# Patient Record
Sex: FEMALE | Race: WHITE
[De-identification: names, ages, dates, MRNs, and addresses within clinical notes are randomized per-mention and may not be internally consistent; named-entity substitution may affect disease eponyms.]

---

## 2019-12-12 ENCOUNTER — HOSPITAL ENCOUNTER (OUTPATIENT)
Dept: HOSPITAL 47 - PNWHC3 | Age: 44
Discharge: HOME | End: 2019-12-12
Attending: ANESTHESIOLOGY
Payer: COMMERCIAL

## 2019-12-12 DIAGNOSIS — M47.812: ICD-10-CM

## 2019-12-12 DIAGNOSIS — Z51.81: ICD-10-CM

## 2019-12-12 DIAGNOSIS — Z79.1: ICD-10-CM

## 2019-12-12 DIAGNOSIS — Z79.891: ICD-10-CM

## 2019-12-12 DIAGNOSIS — M50.20: ICD-10-CM

## 2019-12-12 DIAGNOSIS — Z79.890: ICD-10-CM

## 2019-12-12 DIAGNOSIS — M54.16: ICD-10-CM

## 2019-12-12 DIAGNOSIS — M79.18: ICD-10-CM

## 2019-12-12 DIAGNOSIS — Z79.899: ICD-10-CM

## 2019-12-12 DIAGNOSIS — G89.29: Primary | ICD-10-CM

## 2019-12-12 DIAGNOSIS — F17.200: ICD-10-CM

## 2019-12-12 PROCEDURE — 80307 DRUG TEST PRSMV CHEM ANLYZR: CPT

## 2019-12-12 PROCEDURE — 99211 OFF/OP EST MAY X REQ PHY/QHP: CPT

## 2019-12-12 NOTE — P.PAINPG
Subjective


Progress Note Date: 12/12/19





This is a follow-up visit for this 44 years old female with a chronic history of

severe neck pain, she has been managed with a combination of medications and 

interventional pain procedures.  She most recently underwent trigger point 

injections in her neck and low back on 10/23/2019, with good benefit.  She has 

been diagnosed with cervical spondylosis and cervical herniated disc disease, 

Status post  posterior cervical decompression, , cervical myofascial pain 

syndrome. she is currently on Norco  7.5/325 every 8 hours , Neurontin 400 mg 3 

times a day,meloxicam and Fioricet. she denies any side effect of the 

medication.  She was also obtaining Robaxin from our clinic, however her primary

care physician switched her over to tizanidine.  she denies any excessive 

drowsiness or sleepiness, she denies any suicidal ideation, patient wishes to 

decrease her pain medication, she continued to have some numbness in her right 

hand. pain in neck is rated as 5/10.  Today, she is also complaining of right 

low back pain radiating to right buttocks and lateral aspect of right thigh as 

well as anterior calf.  She rates this pain as 10/10.  She states that this pain

has been present on and off for approximately 18 years, usually subsides within 

1-2 days spontaneously.  However, this episode has been present since the first 

week of November and does not seem to be improving.  She was evaluated by her 

primary care physician who provided an intramuscular steroid injection, with 

modest benefit.  She has also been to a chiropractor with some benefit.  She 

denies numbness and tingling in lower extremities, she does note subjective 

weakness.she has not had any imaging of her lumbar spine.


Review of systems is negative for chest pain, shortness of breath, changes in 

vision, changes in hearing, new onset weakness, abdominal pain, diarrhea, 

extreme fatigue, malaise, fever, skin changes, homicidal or suicidal ideation, 

or bowel or bladder incontinence.





Objective


Physical exam:


Vitals: Reviewed in EMR


GENERAL: Well appearing, in no acute distress


PSYCH: Mood and affect is appropriate. Awake, alert, and oriented


SKIN: Skin color, texture, turgor normal, no rashes or lesions


HEENT: Normocephalic, atraumatic. EOM intact


CV: No pedal edema


RESP: Respirations are unlabored, no audible wheezing


GI: Abdomen non-distended


MUSCULOSKELETAL: Bilateral upper and lower extremity strength is normal and 

symmetric. No atrophy or tone abnormalities are noted.


Neck:tenderness to palpation over the cervical paraspinous muscles. Spurling 

negative, Sin's sign negative. palpable trigger points in cervical 

paraspinals, trapezius and rhomboids bilaterally.


Lumbar spine: Straight leg raising in the sitting position is positive on the 

right side for radicular pain. tenderness to palpation over the lumbar spine and

paraspinous muscles bilaterally, with palpable trigger points.


Buttocks: No pain to palpation over the PSIS, sacroiliac joint maneuvers are 

negative for pain.


Extremities: Peripheral joint ROM is full and pain free without obvious instab

ility or laxity in all four extremities. No edema or skin discolorations noted.


Gait: Gait is normal


NEUR: Bilateral upper and lower extremity coordination and muscle stretch


reflexes are physiologic and symmetric.  Negative clonus bilaterally. No loss of

sensation is noted.





 





Assessment and Plan


Plan: 


Assessment and plan=


   chronic neck pain secondary to cervical herniated disc disease  , cervical 

spondylosis, myofascial pain syndrome .


   Myofascial pain syndrome cervical trapezius suprascapular and right lumbar 

region


   lumbar radicular pain


   chronic and current  use of high-risk medication (opioids)


   Patient denies any side effects of the current pain medication  and the 

current treatment/medication helping the  patient to do activity of daily living

,


   she is interested in weaning narcotics.


   MAPS Reviewed and it was appropriate .


   Medication managements= patient will be given prescription refills for Norco 

5/325 every 8 hours dispense 90 with 1 refill this was reduced from Norco 7.5 

every 8 hours. we will plan on continuing to reduce her opioid dosage over time.

she is amenable to this plan. Prescriptions also provided Neurontin 400 mg 3 

times a day dispense 90 with 1 refill , meloxicam and Fioricet.  Patient no 

longer taking Robaxin as her primarycare physician is prescribing her tizanidine


   investigations: We will obtain a lumbar MRI prior to proceeding with lumbar 

procedures.  Prescription for 1 tablet of Xanax 0.5 mg was provided, as the 

patient is claustrophobic.  We will also obtain a urine drug screen today.


   Interventions= to be determined following lumbar MRI. patient could also 

benefit from trigger point injection right and left cervical and trapezius and 

suprascapular area and the right lumbar paravertebral muscles, we will plan on 

doing this with the next procedure.  


  I spent 3 minutes counseling her on the importance of smoking cessation as it 

relates to chronic pain and overall health 


    follow-up: After MRI





PQRS Measure Charge Sheet


Measure #130: Documentation of Current Meds in Medical Chart: Patient's 

medications documented in chart


Measure #226: Tobacco Use: Screen & Cessation Intervention: Pt screened for 

tobacco use AND intervention given


Measure #111: Pneumonia Vaccination: Pneumococcal vaccine NOT administered or 

previously given


Measure #47: Advance Care Plan: Advance care planning discussed & documented, pt

chose/unable to give


Measure #412: Opioid Treatment Agreement: Documented signed opioid trtmnt 

agreemnt min once during opioid trtmnt


Measure #408: Opioid Therapy Follow-up Evaluation: Patient had f/u eval minimum 

every 3 months during opioid therapy


Measure #317: Preventitive Care & Scrn High Bld Press & F/U: Normal blood 

pressure, f/u not required


Measure #128: Body Mass Index (BMI) Screening & Follow-up: BMI documented ABOVE 

normal parameters - f/u documented


Measure #131: Pain Assessment & Follow-up: Pain positive & plan documented, 

Follow-up scheduled


Measure #431: Unhealthy Alcohol Use Preventative Care & Scrn: Patient not 

identified as an unhealthy alcohol user





Objective





- Vital Signs


Vital signs: 


                                 Intake & Output











 12/11/19 12/12/19 12/12/19





 18:59 06:59 18:59


 


Weight 97.522 kg  














PQRS Measure Charge Sheet


PQRS Narrative: 


                                        





Smoking Status                   Current every day smoker


Narcotic Agreement Date Signed   03/08/18


Hx Alcohol Use (MH)              No








Home Medications: 


Ambulatory Orders





Thyroid,Pork [[place] Thyroid] 180 mg PO DAILY 06/11/14 


Citalopram Hydrobromide [CeleXA] 20 mg PO DAILY 05/02/17 


Rosuvastatin Calcium [Crestor] 10 mg PO DAILY 03/06/19 


Gabapentin [Neurontin] 400 mg PO TID #90 cap 08/22/19 


HYDROcodone/APAP 7.5-325MG [Norco 7.5-325] 1 tab PO TID 08/22/19 


Multivitamins, Thera [Multivitamin (formulary)] 1 tab PO DAILY 08/22/19 


Butalbit/Acetamin/Caff/Codeine [Butal/APAP/Caff/Cod -07-30MG] 1 tab PO Q6H

PRN 10/11/19 


Meloxicam [Mobic] 7.5 mg PO DAILY 10/11/19 


Methocarbamol [Robaxin] 500 mg PO TID PRN 10/11/19 











Controlled Substance Measures





- Controlled Substance Measures


Is patient prescribed a controlled substance at discharge?: Yes


When asked, does pt state using other controlled substances?: No


If prescribed controlled substance>3 days was MAPS reviewed?: Yes


If Rx opioid, was Start Talking consent form obtained?: Yes


If opioid is for acute pain is fill amount 7 days or less?: No


Was information provided regarding opioid addiction?: Yes

## 2019-12-24 ENCOUNTER — HOSPITAL ENCOUNTER (OUTPATIENT)
Dept: HOSPITAL 47 - RADMRIMAIN | Age: 44
Discharge: HOME | End: 2019-12-24
Attending: ANESTHESIOLOGY
Payer: COMMERCIAL

## 2019-12-24 DIAGNOSIS — M47.26: Primary | ICD-10-CM

## 2019-12-24 PROCEDURE — 72148 MRI LUMBAR SPINE W/O DYE: CPT

## 2019-12-24 NOTE — MR
EXAMINATION TYPE: MR lumbar spine wo con

 

DATE OF EXAM: 12/24/2019

 

COMPARISON: NONE

 

HISTORY: Low back pain, rt leg pain with numbness into foot

 

TECHNIQUE: Multiplanar, multisequence imaging of the lumbar spine is performed without IV contrast.

 

FINDINGS: Sagittal images of the lumbar spine show vertebral body heights and alignment to appear sat
isfactory. There is disc desiccation L3-L4 through the L5-S1 levels.  There is mild disc space narrow
ing with annular tear posteriorly L5-S1 level. The conus medullaris is normal in position and signal 
ending at L1 level.  The bone marrow signal intensity is within normal limits.

 

Axial images show the T12-L1, L1-L2, and L2-L3 levels all to appear within normal limits.

 

Axial images at the L3-L4 level show mild broad disc bulge minimally effacing the anterior thecal sac
, bilateral neural foramina remain patent.

 

Axial images at L4-L5 levels mild to Moderate facet degenerative changes bilaterally with mild broad 
the anterior thecal sac, bilateral neural foramina remain patent.

 

Axial images at L5-S1 level show mild facet degenerative changes bilaterally.  There is central disc 
protrusion but the spinal canal is preserved, bilateral neural foramina are patent. 

 

No suspicious incidental retroperitoneal finding.

 

IMPRESSION: Mild multilevel degenerative changes lower lumbar spine, no significant focal disc hernia
tion seen to account for patient's right-sided radiculopathy type symptoms.

## 2020-01-07 ENCOUNTER — HOSPITAL ENCOUNTER (OUTPATIENT)
Dept: HOSPITAL 47 - PNWHC3 | Age: 45
Discharge: HOME | End: 2020-01-07
Attending: ANESTHESIOLOGY
Payer: COMMERCIAL

## 2020-01-07 VITALS — DIASTOLIC BLOOD PRESSURE: 77 MMHG | HEART RATE: 83 BPM | SYSTOLIC BLOOD PRESSURE: 111 MMHG | RESPIRATION RATE: 18 BRPM

## 2020-01-07 DIAGNOSIS — M50.30: ICD-10-CM

## 2020-01-07 DIAGNOSIS — M46.92: ICD-10-CM

## 2020-01-07 DIAGNOSIS — F17.200: ICD-10-CM

## 2020-01-07 DIAGNOSIS — Z79.1: ICD-10-CM

## 2020-01-07 DIAGNOSIS — R51: ICD-10-CM

## 2020-01-07 DIAGNOSIS — Z79.890: ICD-10-CM

## 2020-01-07 DIAGNOSIS — M46.96: ICD-10-CM

## 2020-01-07 DIAGNOSIS — M47.816: ICD-10-CM

## 2020-01-07 DIAGNOSIS — G89.29: Primary | ICD-10-CM

## 2020-01-07 DIAGNOSIS — M79.18: ICD-10-CM

## 2020-01-07 DIAGNOSIS — M51.36: ICD-10-CM

## 2020-01-07 DIAGNOSIS — Z79.899: ICD-10-CM

## 2020-01-07 DIAGNOSIS — Z79.891: ICD-10-CM

## 2020-01-07 DIAGNOSIS — M47.812: ICD-10-CM

## 2020-01-07 PROCEDURE — 99211 OFF/OP EST MAY X REQ PHY/QHP: CPT

## 2020-01-07 NOTE — P.PAINPG
Subjective


Progress Note Date: 01/07/20





This is a follow-up visit for this 44 years old female with a chronic history of

severe neck pain, she has been managed with a combination of medications and 

interventional pain procedures.  She most recently underwent trigger point 

injections in her neck and low back on 10/23/2019, with good benefit.  She has 

been diagnosed with cervical spondylosis and cervical herniated disc disease, 

Status post  posterior cervical decompression, , cervical myofascial pain 

syndrome. she is currently on Norco  7.5/325 every 8 hours , Neurontin 400 mg 3 

times a day,meloxicam and Fioricet. she denies any side effect of the 

medication.  She was also obtaining Robaxin from our clinic, however her primary

care physician switched her over to tizanidine.  she denies any excessive 

drowsiness or sleepiness, she denies any suicidal ideation, patient wishes to 

decrease her pain medication, she continued to have some numbness in her right 

hand. pain in neck is rated as 5/10.  Today, she is also complaining of right 

low back pain radiating to right buttocks and lateral aspect of right thigh as 

well as anterior calf.  She rates this pain as 10/10.  She states that this pain

has been present on and off for approximately 18 years, usually subsides within 

1-2 days spontaneously.  However, this episode has been present since the first 

week of November and does not seem to be improving.  She was evaluated by her 

primary care physician who provided an intramuscular steroid injection, with 

modest benefit.  She has also been to a chiropractor with some benefit.  She 

denies numbness and tingling in lower extremities, she does note subjective 

weakness.she had MRI of the lumbar spine done recently and she is here today to 

discuss the results of the MRI


Review of systems is negative for chest pain, shortness of breath, changes in 

vision, changes in hearing, new onset weakness, abdominal pain, diarrhea, 

extreme fatigue, malaise, fever, skin changes, homicidal or suicidal ideation, 

or bowel or bladder incontinence.





Objective





- Vital Signs


Vital signs: 


                                   Vital Signs











Temp      


 


Pulse  83   01/07/20 13:48


 


Resp  18   01/07/20 13:48


 


BP  111/77   01/07/20 13:48


 


Pulse Ox  96   01/07/20 13:48














- Exam


    


   Physical Examinations  :


    -Constitutiona       : Cooperative , not in acute distress .


    -HEENT                :  nech :  supple ,  no Lymphadenopathy  , normal  

thyroid  size .


                               :   eyes  :  no ptosis , no icterus,  no 

photophobia .                                                                   

                                                                                

                                                                                

                                                                                

                                                             


    - neurologic         :   Cranial nerve II   to  XII  intact ,  no   focal 

neurological deffecit  .


    -psychatric          : alert ,  oriented  X 3  ,   appropriate affect   , 

intact judgment  and insight  .  


    -Lymphatic          :    no Lymphadenopathy .


   - musculoskeltal   :     


                                 Cervical Spine 


                                         motor  stregnth in the deltoid and 

biceps,   normal   right side    ,  normal  Left side


                                         motor  stregnth biceps and the wrist 

extensors   normal right side ,normal left side .


                                         motor stregnth in the triceps muscle . 

normal  Right side , normal  Left side  


                                         deep tendon reflexes  normal   at the 

biceps ,   normal  at Brachioradialis , normal  at triceps.


                                         cervical facet loading test: Positive 

Bilaterally


                                         Multiple trigger point in the cervical 

paravertebral muscles.


                                   Lumber spine


                                         moter stegnth lower extremities ,thigh 

and legs  5/5 Right side ,  5/5  Left side 


                                         deep tendon reflexes :   normal  Knee 

Jerk    , normal   ankle Jerk  


                                         lumber facet Loading Test =positive 

Right , positive Left 


                                         Range of motion of the lumbar spine  

Flexion  30 degrees,   extension   10 degrees


                                         strait leg raising test = positive at  

   degree   


                                          Fabere test= positive Right ,    and  

positive  LT .


MRI of the lumbar spine done at the Hospital= multilevel lumbar compression test

disease and multilevel lumbar spondylosis with lumbar facet arthropathy


       





Assessment and Plan


Plan: 


Assessment and plan=


   chronic low back pain secondary to lumbar degenerative disc disease  , lumbar

spondylosis with lumbar facet arthropathy .


   Neck pain and headache secondary to cervical degenerative disc disease and 

cervical spondylosis, myofascial pain syndrome and cervical area


   chronic and current  use of high-risk medication (opioids)


   Patient denies any side effects of the current pain medication  and the 

current treatment/medication helping the  patient to do activity of daily living

,


   Diagnoses, prognosis, treatment options, including but not limited to 

physical therapy, medication management, interventional therapies, and surgery, 

were discussed with the patient


   All the questions answered


   The narcotic consent was signed and patient agreed and understood the side 

effects and complications of opioid treatment.


   Patient signed the narcotic agreement, and  was orally counseled, not to 

overuse, not to abuse, not to Divert , not tp sell pain medication, and to take 

it as prescribed only,


   Patient was counseled not to drive or operate heavy equipment while using 

narcotic medication, and advised not to use alcohol or any Illicit  drugs while 

using the narcotis.


   understanding that lack of compliance with any of the above instructions, 

will likely to cause discharge from, the pain service, not to renew his narcotic

prescriptions


   MAPS Reviwed and it was apropriate .


   Medication managements= patient has prescription refill for her medication on

last visit, but she reported that her frequency of the headache increases 

significantly


   She is currently not benefiting from Fioricet and she used to have a better 

result with the Fiorinal, prescription for Fiorinal one tablet by mouth every 6 

hours when necessary dispensed 


   15 tablet and one refill, and patient could benefit from lumbar epidural 

steroid injection at L4 5 levels (right paramedian approach )


               , 


Time with Patient: Less than 30





PQRS Measure Charge Sheet


Measure #130: Documentation of Current Meds in Medical Chart: Patient's 

medications documented in chart


Measure #226: Tobacco Use: Screen & Cessation Intervention: Pt screened for 

tobacco use AND intervention given


Measure #111: Pneumonia Vaccination: Pneumococcal vaccine NOT administered or 

previously given


Measure #47: Advance Care Plan: Advance care planning discussed & documented, pt

chose/unable to give


Measure #412: Opioid Treatment Agreement: Documented signed opioid trtmnt 

agreemnt min once during opioid trtmnt


Measure #408: Opioid Therapy Follow-up Evaluation: Patient had f/u eval minimum 

every 3 months during opioid therapy


Measure #317: Preventitive Care & Scrn High Bld Press & F/U: Normal blood 

pressure, f/u not required


Measure #128: Body Mass Index (BMI) Screening & Follow-up: BMI documented ABOVE 

normal parameters - f/u documented


Measure #131: Pain Assessment & Follow-up: Pain positive & plan documented, 

Follow-up scheduled


Measure #431: Unhealthy Alcohol Use Preventative Care & Scrn: Patient identified

as unhealthy alcohol user; counseling given


PQRS Narrative: 


                                        





Smoking Status                   Current every day smoker


Narcotic Agreement Date Signed   04/30/19


Blood Pressure                   111/77


Pain Intensity [Right Hip]       7


Pain Intensity [Right Neck]      8


Pain Intensity [None]            0


Scale Used                       Numeric (1 - 10)


Hx Alcohol Use (MH)              No








Home Medications: 


Ambulatory Orders





Thyroid,Pork [[place] Thyroid] 180 mg PO DAILY 06/11/14 


Citalopram Hydrobromide [CeleXA] 20 mg PO DAILY 05/02/17 


Gabapentin [Neurontin] 400 mg PO TID #90 cap 08/22/19 


Multivitamins, Thera [Multivitamin (formulary)] 1 tab PO DAILY 08/22/19 


Butalbit/Acetamin/Caff/Codeine [Butal/APAP/Caff/Cod -76-30MG] 1 tab PO Q6H

PRN 10/11/19 


Meloxicam [Mobic] 7.5 mg PO DAILY 10/11/19 


HYDROcodone/APAP 5-325MG [Norco 5-325] 1 tab PO TID PRN 01/02/20 


tiZANidine [Zanaflex] 4 mg PO Q8HR PRN 01/07/20 











Controlled Substance Measures





- Controlled Substance Measures


Is patient prescribed a controlled substance at discharge?: Yes


When asked, does pt state using other controlled substances?: No


If prescribed controlled substance>3 days was MAPS reviewed?: Yes


If Rx opioid, was Start Talking consent form obtained?: Yes


If opioid is for acute pain is fill amount 7 days or less?: No


Was information provided regarding opioid addiction?: Yes

## 2020-02-03 ENCOUNTER — HOSPITAL ENCOUNTER (OUTPATIENT)
Dept: HOSPITAL 47 - ORPAIN | Age: 45
Discharge: HOME | End: 2020-02-03
Attending: ANESTHESIOLOGY
Payer: COMMERCIAL

## 2020-02-03 VITALS — RESPIRATION RATE: 18 BRPM

## 2020-02-03 VITALS — SYSTOLIC BLOOD PRESSURE: 119 MMHG | DIASTOLIC BLOOD PRESSURE: 81 MMHG

## 2020-02-03 VITALS — TEMPERATURE: 97.9 F | HEART RATE: 69 BPM

## 2020-02-03 VITALS — BODY MASS INDEX: 32.6 KG/M2

## 2020-02-03 DIAGNOSIS — M51.16: ICD-10-CM

## 2020-02-03 DIAGNOSIS — Z88.2: ICD-10-CM

## 2020-02-03 DIAGNOSIS — M47.26: Primary | ICD-10-CM

## 2020-02-03 PROCEDURE — 62323 NJX INTERLAMINAR LMBR/SAC: CPT

## 2020-02-03 PROCEDURE — 81025 URINE PREGNANCY TEST: CPT

## 2020-02-03 NOTE — FL
EXAMINATION TYPE: FL guided pain mgmt statistic

 

DATE OF EXAM: 2/3/2020

 

CLINICAL HISTORY: Low back pain.

 

TECHNIQUE: Fluoroscopy.

 

COMPARISON:  None.

 

FINDINGS:  Fluoroscopic guidance was provided during pain relief procedure performed by Dr. Benoit 
.  A total of 3 seconds of fluoroscopic time was utilized during the procedure and 1 spot image was a
cquired. Image acquired shows needle localization  of the lumbar spine.

 

IMPRESSION:  As Above.

## 2020-02-03 NOTE — P.PCN
Date of Procedure: 02/03/20


Procedure(s) Performed: 


 


PREOPERATIVE DIAGNOSIS: 


1- Lumbar Degenerative Disc Diseases


2-Lumbar spondylosis with  Facet arthropathy without myelopathy


POSTOPERATIVE DIAGNOSIS: 


1-Lumber Degenerative Disc Diseases


2-Lumbar spondylosis with  Facet arthropathy without myelopathy


PROCEDURE


1. Lumbar epidural steroid injection under fluoroscopic guidance at the L4-5 

level.(Right paramedian approach)    (Fluoroscopy imaging was available in 

radiology department)


2. Lumbar epidurogram. 


ANESTHESIA: Local with 1% lidocaine 3 ml and , moderate sedation with  

intravenous Versed  2  mg ,and fentanyle   100   Mcg


EBL: Minimal


PROCEDURE INDICATION: The patient with low back pain and radiculitis symptoms 

unresponsive to conservative treatment. Fluoroscopy was used to optimize 

visualization of the needle placement and to maximize safety. 


PROCEDURE DESCRIPTION / TECHNIQUE: 


  The patient was seen and identified in the preoperative area. Risks, benefits,

complications including but not limited to infections ,bleeding ,allergic 

reaction to the medications ,nerve damage and not complete pain releife , and 

alternatives were discussed with the patient. The patient agreed to proceed with

the procedure and signed the consent. IV was started, and vital signs were 

stable.


  Patient was taken to the OR and time out was completed. The patient was placed

 in the prone position on procedure table and a pillow was placed under the 

abdomen to reduce lumbar lordosis. The  lumbosacral area was prepped  and draped

in the usual sterile fashion.ere closely monitored during the procedure. 

Conscious sedation was used during the procedure to decrease patients anxiety. 

Vital signs was monitered during the entire procedure.


Using anterior-posterior fluoroscopy, the L4-5 interlaminar space was identified

and the skin over this site was marked and then infiltrated with 1% lidocaine 

subcutaneously. Subsequently, a 20-gauge Tuohy epidural needle was inserted and 

advanced toward the epidural space using the ``Loss of resistance technique and 

guided by AP and lateral fluoroscopy. The correct needle position in the 

epidural space was verified with the injection of 2 mL of the water soluble 

contrast dye Isovue 200  contrast and observing an excellent epidurogram with 

the epidural spread of the dye, after negative aspiration for blood and CSF and 

in the absence of paresthesias. Again after negative aspiration, a 6  ml mixture

containing 80 mg of Depo-medrol , and 2  ml of preservative free Normal Saline, 

and 2 ml of preservative free lidocaine 1% solution was injected and a washout 

of epidurogram was seen. Needle was withdrawn intact, skin was cleansed, and 

bandages were applied. 


COMPLICATIONS: None


DISPOSITION / PLANS: The patient was placed in a supine position and transferred

to the recovery area in a stable condition for observation. There was no 

evidence of lower extremity motor or sensory deficit after the procedure.  

Patient was discharged from the recovery room after meeting discharge criteria. 

Home discharge instructions were given to the patient by the staff. The patient 

was reexamined prior to discharge. The patient will schedule a follow up in the 

clinic in 2-4 weeks.

## 2020-02-06 ENCOUNTER — HOSPITAL ENCOUNTER (OUTPATIENT)
Dept: HOSPITAL 47 - PNWHC3 | Age: 45
Discharge: HOME | End: 2020-02-06
Attending: ANESTHESIOLOGY
Payer: COMMERCIAL

## 2020-02-06 VITALS — DIASTOLIC BLOOD PRESSURE: 62 MMHG | SYSTOLIC BLOOD PRESSURE: 118 MMHG | HEART RATE: 105 BPM | RESPIRATION RATE: 16 BRPM

## 2020-02-06 DIAGNOSIS — M47.812: ICD-10-CM

## 2020-02-06 DIAGNOSIS — M79.18: ICD-10-CM

## 2020-02-06 DIAGNOSIS — Z79.1: ICD-10-CM

## 2020-02-06 DIAGNOSIS — M50.20: ICD-10-CM

## 2020-02-06 DIAGNOSIS — G89.29: Primary | ICD-10-CM

## 2020-02-06 DIAGNOSIS — F17.200: ICD-10-CM

## 2020-02-06 DIAGNOSIS — M51.16: ICD-10-CM

## 2020-02-06 DIAGNOSIS — M47.26: ICD-10-CM

## 2020-02-06 DIAGNOSIS — Z79.899: ICD-10-CM

## 2020-02-06 PROCEDURE — 99211 OFF/OP EST MAY X REQ PHY/QHP: CPT

## 2020-02-10 NOTE — P.PAINPG
Subjective


Progress Note Date: 02/06/20








This is a follow-up visit for this 44 year old female with a chronic history of 

severe neck and back pain, she has been diagnosed with cervical spondylosis and 

cervical herniated disc disease, Status post  posterior cervical decompression, 

, cervical myofascial pain syndrome, lumbar radicular pain, she has been managed

with a combination of medications and interventional pain procedures.  She most 

recently underwent lumbar epidural steroid injection at L4-5, right paramedian 

approach, on 02/03/2020.  She returns today for follow-up.  She is yet to 

experience any relief from this procedure, and she does have a repeat procedure 

scheduled on 02/19/2020.  she is currently on Norco  5/325 every 8 hours, this 

was weaned at last visit from 7.5/325, she has been doing well with this wean.  

She is also taking Neurontin 400 mg 3 times a day,meloxicam, tizanidine and 

Fiorinal. she denies any side effect of the medication.   she denies any 

excessive drowsiness or sleepiness, she denies any suicidal ideation. 


 Today, she is complaining of right low back pain radiating to right buttocks 

and lateral and anterior aspect of right thigh as well as anterior calf.  She 

has tried yoga therapy, this worsened her pain.  She has also tried chiropractic

therapy, this was somewhat beneficial.


Review of systems is negative for chest pain, shortness of breath, changes in 

vision, changes in hearing, new onset weakness, abdominal pain, diarrhea, 

extreme fatigue, malaise, fever, skin changes, homicidal or suicidal ideation, 

or bowel or bladder incontinence.  She does endorse anxiety.





Objective


Physical exam:


Vitals: Reviewed in EMR


GENERAL: Well appearing, in no acute distress


PSYCH: Mood and affect is appropriate. Awake, alert, and oriented


SKIN: Skin color, texture, turgor normal, no rashes or lesions


HEENT: Normocephalic, atraumatic. EOM intact


CV: No pedal edema


RESP: Respirations are unlabored, no audible wheezing


GI: Abdomen non-distended


MUSCULOSKELETAL: Bilateral lower extremity strength is normal and symmetric. No 

atrophy or tone abnormalities are noted.


Lumbar spine: Straight leg raising in the sitting position is positive on the 

right side for radicular pain. tenderness to palpation over the lumbar spine and

paraspinous muscles bilaterally


Buttocks: No pain to palpation over the PSIS, sacroiliac joint maneuvers are 

negative for pain.


Extremities: Peripheral joint ROM is full and pain free without obvious 

instability or laxity in all four extremities. No edema or skin discolorations 

noted.


Gait: Gait is normal


NEUR: Bilateral lower extremity coordination and muscle stretch


reflexes are physiologic and symmetric.  Negative clonus bilaterally. No loss of

sensation is noted.





 Lumbar MRI done at [facility] shows multilevel mild degenerative disc 

disease, facet hypertrophy in the lower lumbar spine.





Assessment and Plan


Plan: 


Assessment and plan=


   chronic neck pain secondary to cervical herniated disc disease  , cervical 

spondylosis, myofascial pain syndrome .


   Myofascial pain syndrome cervical trapezius suprascapular and right lumbar 

region


   lumbar radicular pain, lumbar degenerative disc disease, lumbar spondylosis


   chronic and current  use of high-risk medication (opioids)


   Patient denies any side effects of the current pain medication  and the 

current treatment/medication helping the  patient to do activity of daily living

,


   MAPS Reviewed and it was appropriate .


   Medication managements= patient will be given prescription refills for Norco 

5/325 every 8 hours dispense 90 with 1 refill this was reduced from Norco 7.5 

every 8 hours at the last visit. we will plan on continuing to reduce her opioid

dosage over time, it was not reduced at this visit, we will plan on reducing it 

at next visit. Prescriptions also provided Neurontin 400 mg 3 times a day 

dispense 90 with 1 refill , meloxicam and Fiorinal  Patient no longer taking 

Robaxin as her primary care physician is prescribing her tizanidine


   investigations: MRI lumbar spine reviewed


   Interventions= patient is scheduled to have repeat lumbar epidural steroid 

injection on 02/19/2020


    follow-up: For above-mentioned procedure, and in 2 months for medication 

management





PQRS Measure Charge Sheet


Measure #130: Documentation of Current Meds in Medical Chart: Patient's 

medications documented in chart


Measure #226: Tobacco Use: Screen & Cessation Intervention: Pt screened for 

tobacco use AND refused intervention


Measure #111: Pneumonia Vaccination: Pneumococcal vaccine NOT administered or 

previously given


Measure #47: Advance Care Plan: Advance care planning discussed & documented, pt

chose/unable to give


Measure #412: Opioid Treatment Agreement: Documented signed opioid trtmnt 

agreemnt min once during opioid trtmnt


Measure #408: Opioid Therapy Follow-up Evaluation: Patient had f/u eval minimum 

every 3 months during opioid therapy


Measure #317: Preventitive Care & Scrn High Bld Press & F/U: Normal blood 

pressure, f/u not required


Measure #128: Body Mass Index (BMI) Screening & Follow-up: BMI documented ABOVE 

normal parameters - f/u documented


Measure #131: Pain Assessment & Follow-up: Pain positive & plan documented, 

Follow-up scheduled


Measure #431: Unhealthy Alcohol Use Preventative Care & Scrn: Patient not 

identified as an unhealthy alcohol user





PQRS Measure Charge Sheet


PQRS Narrative: 


                                        





Smoking Status                   Current every day smoker


Narcotic Agreement Date Signed   03/08/18


Pain Intensity [Bilateral        4


Shoulder]                        


Pain Intensity [Right Buttock]   2


Scale Used                       Numeric (1 - 10)


Hx Alcohol Use (MH)              No








Home Medications: 


Ambulatory Orders





Thyroid,Pork [[place] Thyroid] 180 mg PO DAILY 06/11/14 


Citalopram Hydrobromide [CeleXA] 20 mg PO DAILY 05/02/17 


Gabapentin [Neurontin] 400 mg PO TID #90 cap 08/22/19 


Multivitamins, Thera [Multivitamin (formulary)] 1 tab PO DAILY 08/22/19 


Butalbit/Acetamin/Caff/Codeine [Butal/APAP/Caff/Cod -43-30MG] 1 tab PO Q6H

PRN 10/11/19 


Meloxicam [Mobic] 7.5 mg PO DAILY 10/11/19 


HYDROcodone/APAP 5-325MG [Norco 5-325] 1 tab PO TID PRN 01/02/20 


tiZANidine [Zanaflex] 4 mg PO Q8HR PRN 01/07/20 











Controlled Substance Measures





- Controlled Substance Measures


Is patient prescribed a controlled substance at discharge?: Yes


When asked, does pt state using other controlled substances?: No


If prescribed controlled substance>3 days was MAPS reviewed?: Yes


If Rx opioid, was Start Talking consent form obtained?: Yes


If opioid is for acute pain is fill amount 7 days or less?: No


Was information provided regarding opioid addiction?: Yes

## 2020-02-19 ENCOUNTER — HOSPITAL ENCOUNTER (OUTPATIENT)
Dept: HOSPITAL 47 - ORPAIN | Age: 45
Discharge: HOME | End: 2020-02-19
Payer: COMMERCIAL

## 2020-02-19 VITALS — DIASTOLIC BLOOD PRESSURE: 70 MMHG | SYSTOLIC BLOOD PRESSURE: 106 MMHG

## 2020-02-19 VITALS — HEART RATE: 69 BPM | RESPIRATION RATE: 18 BRPM

## 2020-02-19 VITALS — BODY MASS INDEX: 32.8 KG/M2

## 2020-02-19 VITALS — TEMPERATURE: 97.8 F

## 2020-02-19 DIAGNOSIS — M50.30: ICD-10-CM

## 2020-02-19 DIAGNOSIS — Z88.2: ICD-10-CM

## 2020-02-19 DIAGNOSIS — M54.16: Primary | ICD-10-CM

## 2020-02-19 PROCEDURE — 99152 MOD SED SAME PHYS/QHP 5/>YRS: CPT

## 2020-02-19 PROCEDURE — 62323 NJX INTERLAMINAR LMBR/SAC: CPT

## 2020-02-19 PROCEDURE — 81025 URINE PREGNANCY TEST: CPT

## 2020-02-19 NOTE — FL
Fluoroscopy

 

INDICATION: Pain

 

FINDINGS:

 

Fluoroscopy time: 4 seconds.

 

Images obtained: 1.

 

IMPRESSIONS:

1. Documentation of fluoroscopy.

## 2020-02-19 NOTE — P.PCN
Date of Procedure: 02/19/20


Description of Procedure: 


PREOPERATIVE DIAGNOSIS: lumbar radiculopathy





POSTOPERATIVE DIAGNOSIS: Lumbar radiculopathy








PROCEDURE


1. Lumbar epidural steroid injection under fluoroscopic guidance at the L4-L5 

level. 


2. Lumbar epidurogram. 





Imaging: Fluoroscopy was used, images where saved to the medical record





ANESTHESIA: Local with 1% lidocaine 5 ml and IV conscious sedation





EBL: Minimal





PROCEDURE INDICATION: The patient with low back pain and radiculitis symptoms 

unresponsive to conservative treatment. Fluoroscopy was used to optimize 

visualization of the needle placement and to maximize safety. 





PROCEDURE DESCRIPTION / TECHNIQUE: 


The patient was seen and identified in the preoperative area. Risks, benefits, 

complications including but not limited to infections ,bleeding ,allergic 

reaction to the medications, nerve damage and  incomplete pain relief , as well 

as alternatives to the procedure were discussed with the patient. The patient 

agreed to proceed with the procedure and signed the consent. IV was started, and

vital signs were stable.





Patient was taken to the OR and time out was completed. The patient was placed 

in the prone position on procedure table and a pillow was placed under the 

abdomen to reduce lumbar lordosis. The lumbosacral area was prepped  and draped 

in the usual sterile fashion.  Vitals were closely monitored during the 

procedure. 





Using anterior-posterior fluoroscopy, the L4-L5 interlaminar space was 

identified and the skin over this site was marked and then infiltrated with 1% 

lidocaine subcutaneously. Subsequently, a 20-gauge Tuohy epidural needle was 

inserted and advanced toward the epidural space using the Loss of resistance 

technique and guided by AP and lateral fluoroscopy. The correct needle position 

in the epidural space was verified with the injection of 1 mL of  Omnipaque 180 

contrast to observe an acceptable epidurogram, after negative aspiration for 

blood and CSF and in the absence of paresthesias. Again after negative 

aspiration, a 3 ml mixture containing 40mg of depomedrol and 2 ml of 

preservative free Normal Saline was injected and a washout of epidurogram was 

seen. Needle was withdrawn intact, skin was cleansed, and bandages were applied.







COMPLICATIONS: None





DISPOSITION / PLANS: The patient was placed in a supine position and transferred

to the recovery area in a stable condition for observation. There was no 

evidence of lower extremity motor or sensory deficit after the procedure.  

Patient was discharged from the recovery room after meeting discharge criteria. 

Home discharge instructions were given to the patient by the staff. The patient 

was reexamined prior to discharge. The patient will follow up as directed.

## 2020-07-02 ENCOUNTER — HOSPITAL ENCOUNTER (OUTPATIENT)
Dept: HOSPITAL 47 - PNWHC3 | Age: 45
Discharge: HOME | End: 2020-07-02
Attending: ANESTHESIOLOGY
Payer: COMMERCIAL

## 2020-07-02 VITALS — DIASTOLIC BLOOD PRESSURE: 70 MMHG | RESPIRATION RATE: 16 BRPM | HEART RATE: 95 BPM | SYSTOLIC BLOOD PRESSURE: 110 MMHG

## 2020-07-02 DIAGNOSIS — M50.20: ICD-10-CM

## 2020-07-02 DIAGNOSIS — M47.812: ICD-10-CM

## 2020-07-02 DIAGNOSIS — Z79.1: ICD-10-CM

## 2020-07-02 DIAGNOSIS — M51.16: ICD-10-CM

## 2020-07-02 DIAGNOSIS — M47.26: ICD-10-CM

## 2020-07-02 DIAGNOSIS — Z79.899: ICD-10-CM

## 2020-07-02 DIAGNOSIS — F11.90: ICD-10-CM

## 2020-07-02 DIAGNOSIS — G89.29: Primary | ICD-10-CM

## 2020-07-02 DIAGNOSIS — M79.18: ICD-10-CM

## 2020-07-02 DIAGNOSIS — F17.200: ICD-10-CM

## 2020-07-02 DIAGNOSIS — Z79.890: ICD-10-CM

## 2020-07-02 PROCEDURE — 80307 DRUG TEST PRSMV CHEM ANLYZR: CPT

## 2020-07-02 PROCEDURE — 99211 OFF/OP EST MAY X REQ PHY/QHP: CPT

## 2020-07-02 NOTE — P.PAINPG
Subjective


Progress Note Date: 07/02/20





This is a follow-up visit for this 44 year old female with a chronic history of 

severe neck and back pain, she has been diagnosed with cervical spondylosis and 

cervical herniated disc disease, Status post  posterior cervical decompression, 

, cervical myofascial pain syndrome, lumbar radicular pain, she has been managed

with a combination of medications and interventional pain procedures.  She most 

recently underwent lumbar epidural steroid injection at L4-5, on 02/19/2020.  

She reports excellent relief from this procedure, she is still obtaining relief.

 She returns today for follow-up.  Today, her pain is located in the bilateral 

neck, radiating to bilateral shoulders, associated with headaches.  Pain is 

rated as 4/10.  Pain is associated with numbness and tingling in bilateral 

hands.  She states that she has been exercising and has lost 10 pounds.  She is 

currently on Norco  5/325 every 8 hours, this was weaned from 7.5/325, she has 

been doing well with this wean.  She is also taking Neurontin 400 mg 3 times a 

day,meloxicam, tizanidine and Fiorinal. she denies any side effect of the medi

cation.   she denies any excessive drowsiness or sleepiness, she denies any 

suicidal ideation. 


 Today, she is complaining of right low back pain radiating to right buttocks 

and lateral and anterior aspect of right thigh as well as anterior calf.  She 

has tried yoga therapy, this worsened her pain.  She has also tried chiropractic

therapy, this was somewhat beneficial.


Review of systems is negative for chest pain, shortness of breath, changes in 

vision, changes in hearing, new onset weakness, abdominal pain, diarrhea, 

extreme fatigue, malaise, fever, skin changes, homicidal or suicidal ideation, 

or bowel or bladder incontinence.  She does endorse anxiety.  She also endorse 

bilateral numbness and tingling in hands, this is worse at night, associated 

with hand swelling.





Objective


Physical exam:


Vitals: Reviewed in EMR


GENERAL: Well appearing, in no acute distress


PSYCH: Mood and affect is appropriate. Awake, alert, and oriented


SKIN: Skin color, texture, turgor normal, no rashes or lesions


HEENT: Normocephalic, atraumatic. EOM intact


CV: No pedal edema


RESP: Respirations are unlabored, no audible wheezing


GI: Abdomen non-distended


MUSCULOSKELETAL: Bilateral upper and lower extremity strength is normal and 

symmetric. No atrophy or tone abnormalities are noted.


Cervical spine: Tenderness to palpation along cervical paraspinal muscles, 

trapezius.  Olinda sign negative.  


Lumbar spine: No tenderness to palpation over the lumbar spine and paraspinous 

muscles bilaterally


Extremities: Peripheral joint ROM is full and pain free without obvious 

instability or laxity in all four extremities. No edema or skin discolorations 

noted.


Gait: Gait is normal


NEUR: Bilateral upper extremity coordination and muscle stretch


reflexes are physiologic and symmetric.  No loss of sensation is noted.





 Lumbar MRI done at [facility] shows multilevel mild degenerative disc 

disease, facet hypertrophy in the lower lumbar spine.





Assessment and Plan


Plan: 


Assessment and plan=


   chronic neck pain secondary to cervical herniated disc disease  , cervical 

spondylosis, myofascial pain syndrome .


   Myofascial pain syndrome cervical trapezius suprascapular and right lumbar 

region


   lumbar radicular pain, lumbar degenerative disc disease, lumbar spondylosis


    Possible bilateral carpal tunnel syndrome


   chronic and current  use of high-risk medication (opioids)


   Patient denies any side effects of the current pain medication  and the 

current treatment/medication helping the  patient to do activity of daily living

,


   MAPS Reviewed and it was appropriate .  Urine drug screen sent today


   Medication managements= patient will be given prescription refills for Norco 

5/325 every 8 hours dispense 90 with 1 refill this was reduced from Norco 7.5 

every 8 hours at the last visit. we will plan on continuing to reduce her opioid

dosage over time, it was not reduced at this visit, we will plan on reducing it 

at next visit. Prescriptions also provided Neurontin 400 mg 3 times a day 

dispense 90 with 1 refill , meloxicam and Fiorinal 


   investigations: Urine drug screen ordered today


   Interventions= none currently as patient would like to wait till current 

pandemic has improved


    follow-up:  in 2 months for medication management





PQRS Measure Charge Sheet


Measure #130: Documentation of Current Meds in Medical Chart: Patient's 

medications documented in chart


Measure #226: Tobacco Use: Screen & Cessation Intervention: Pt screened for 

tobacco use AND refused intervention


Measure #111: Pneumonia Vaccination: Pneumococcal vaccine NOT administered or 

previously given


Measure #47: Advance Care Plan: Advance care planning discussed & documented, pt

chose/unable to give


Measure #412: Opioid Treatment Agreement: Documented signed opioid trtmnt 

agreemnt min once during opioid trtmnt


Measure #408: Opioid Therapy Follow-up Evaluation: Patient had f/u eval minimum 

every 3 months during opioid therapy


Measure #317: Preventitive Care & Scrn High Bld Press & F/U: Normal blood 

pressure, f/u not required


Measure #128: Body Mass Index (BMI) Screening & Follow-up: BMI documented ABOVE 

normal parameters - f/u documented


Measure #131: Pain Assessment & Follow-up: Pain positive & plan documented, 

Follow-up scheduled


Measure #431: Unhealthy Alcohol Use Preventative Care & Scrn: Patient not 

identified as an unhealthy alcohol user





PQRS Measure Charge Sheet


PQRS Narrative: 


                                        





Smoking Status                   Current every day smoker


Narcotic Agreement Date Signed   04/30/19


Pain Intensity [Neck]            8


Scale Used                       Numeric (1 - 10)


Hx Alcohol Use (MH)              No








Home Medications: 


Ambulatory Orders





Thyroid,Pork [[place] Thyroid] 180 mg PO DAILY 06/11/14 


Citalopram Hydrobromide [CeleXA] 20 mg PO DAILY 05/02/17 


Multivitamins, Thera [Multivitamin (formulary)] 1 tab PO DAILY 08/22/19 


tiZANidine [Zanaflex] 4 mg PO Q8HR PRN 01/07/20 


Pantoprazole Sodium [Protonix] 40 mg PO DAILY 06/18/20 


Butalbital-ASA-Caffein-Codeine [Fiorinal w/Cod -34-30MG] 1 cap PO Q4H PRN 

30 Days #15 cap 07/02/20 


Gabapentin [Neurontin] 400 mg PO TID #90 cap 07/02/20 


HYDROcodone/APAP 5-325MG [Norco 5-325] 1 tab PO Q8HR PRN 30 Days #90 tab 

07/02/20 


HYDROcodone/APAP 5-325MG [Norco 5-325] 1 tab PO TID PRN #90 tab 07/02/20 


Meloxicam [Mobic] 7.5 mg PO DAILY #30 tab 07/02/20 











Controlled Substance Measures





- Controlled Substance Measures


Is patient prescribed a controlled substance at discharge?: Yes


When asked, does pt state using other controlled substances?: No


If prescribed controlled substance>3 days was MAPS reviewed?: Yes


If Rx opioid, was Start Talking consent form obtained?: Yes


If opioid is for acute pain is fill amount 7 days or less?: No


Was information provided regarding opioid addiction?: Yes

## 2020-08-26 ENCOUNTER — HOSPITAL ENCOUNTER (OUTPATIENT)
Dept: HOSPITAL 47 - PNWHC3 | Age: 45
Discharge: HOME | End: 2020-08-26
Attending: ANESTHESIOLOGY
Payer: COMMERCIAL

## 2020-08-26 VITALS
TEMPERATURE: 98 F | SYSTOLIC BLOOD PRESSURE: 127 MMHG | HEART RATE: 80 BPM | DIASTOLIC BLOOD PRESSURE: 67 MMHG | RESPIRATION RATE: 18 BRPM

## 2020-08-26 DIAGNOSIS — M47.26: ICD-10-CM

## 2020-08-26 DIAGNOSIS — Z79.1: ICD-10-CM

## 2020-08-26 DIAGNOSIS — F17.200: ICD-10-CM

## 2020-08-26 DIAGNOSIS — Z79.890: ICD-10-CM

## 2020-08-26 DIAGNOSIS — F11.90: ICD-10-CM

## 2020-08-26 DIAGNOSIS — Z79.899: ICD-10-CM

## 2020-08-26 DIAGNOSIS — G89.29: Primary | ICD-10-CM

## 2020-08-26 DIAGNOSIS — M47.812: ICD-10-CM

## 2020-08-26 DIAGNOSIS — M79.18: ICD-10-CM

## 2020-08-26 DIAGNOSIS — M50.20: ICD-10-CM

## 2020-08-26 DIAGNOSIS — M51.16: ICD-10-CM

## 2020-08-26 PROCEDURE — 99211 OFF/OP EST MAY X REQ PHY/QHP: CPT

## 2020-08-26 NOTE — P.PAINPG
Subjective


Progress Note Date: 08/26/20





This is a follow-up visit for this 44 year old female with a chronic history of 

severe neck and back pain, she has been diagnosed with cervical spondylosis and 

cervical herniated disc disease, Status post  posterior cervical decompression, 

, cervical myofascial pain syndrome, lumbar radicular pain, she has been managed

with a combination of medications and interventional pain procedures.  She is 

also taking Neurontin 400 mg 3 times a day,meloxicam, tizanidine and Fiorinal. 

she denies any side effect of the medication.   she denies any excessive 

drowsiness or sleepiness, she denies any suicidal ideation. 


 Currently patient complaining of severe numbness and tingling sensation did 

from the right elbow that was the Right Wrist area.  She denies any weakness in 

her right arm She has also tried chiropractic therapy, this was somewhat 

beneficial.


Review of systems is negative for chest pain, shortness of breath, changes in 

vision, changes in hearing, new onset weakness, abdominal pain, diarrhea, 

extreme fatigue, malaise, fever, skin changes, homicidal or suicidal ideation, o

r bowel or bladder incontinence.  She does endorse anxiety.  She also endorse 

bilateral numbness and tingling in hands, this is worse at night, associated 

with hand swelling.





Objective





- Vital Signs


Vital signs: 


                                   Vital Signs











Temp  98 F   08/26/20 14:40


 


Pulse  80   08/26/20 14:40


 


Resp  18   08/26/20 14:40


 


BP  127/67   08/26/20 14:40


 


Pulse Ox  97   08/26/20 14:40














- Exam


  


    -Constitutiona       : Cooperative , not in acute distress .


    -HEENT                :  nech :  supple ,  no Lymphadenopathy  , normal  

thyroid  size .


                               :   eyes  :  no ptosis , no icterus,  no 

photophobia .                                                                   

                                                                                

                                                                                

                                                                                

                                                             


    - neurologic         :   Cranial nerve II   to  XII  intact ,  no   focal 

neurological deffecit  .


    -psychatric          : alert ,  oriented  X 3  ,   appropriate affect   , 

intact judgment  and insight  .  


    -Lymphatic          :    no Lymphadenopathy .


   - musculoskeltal   :     


                                 Cervical Spine 


                                         motor  stregnth in the deltoid and 

biceps,   normal   right side    ,  normal  Left side


                                         motor  stregnth biceps and the wrist 

extensors   normal right side ,normal left side .


                                         motor stregnth in the triceps muscle . 

normal  Right side , normal  Left side  


                                         deep tendon reflexes  normal   at the 

biceps ,   normal  at Brachioradialis , normal  at triceps.


                                         cervical facet loading test= Positive 

Bilateral


                                   Lumber spine


                                         moter stegnth lower extremities ,thigh 

and legs  5/5 Right side ,  5/5  Left side 





 Lumbar MRI done at [facility] shows multilevel mild degenerative disc 

disease, facet hypertrophy in the lower lumbar spine.








Assessment and Plan


Plan: 





Assessment and plan=


   chronic neck pain secondary to cervical herniated disc disease  , cervical 

spondylosis, myofascial pain syndrome .


   Myofascial pain syndrome cervical trapezius suprascapular and right lumbar 

region


   lumbar radicular pain, lumbar degenerative disc disease, lumbar spondylosis


    Possible right cubital conal syndrome


   chronic and current  use of high-risk medication (opioids)


   Patient denies any side effects of the current pain medication  and the 

current treatment/medication helping the  patient to do activity of daily living

,


   MAPS Reviewed and it was appropriate .  It is reviewed


   Medication managements= patient will be given prescription refills for Norco 

5/325 every 8 hours dispense 90 with 1 refill 


                                  Neurontin 400 mg every 8 hours dispense 90 

with 1 refill


  Patient was referred for orthopedic surgeon for evaluation regarding right 

cubital tunnel syndrome


Time with Patient: Less than 30





PQRS Measure Charge Sheet


Measure #130: Documentation of Current Meds in Medical Chart: Patient's 

medications documented in chart


Measure #226: Tobacco Use: Screen & Cessation Intervention: Pt not a tobacco 

user


Measure #111: Pneumonia Vaccination: Pneumococcal vaccine NOT administered or 

previously given


Measure #47: Advance Care Plan: Advance care planning discussed & documented, pt

chose/unable to give


Measure #412: Opioid Treatment Agreement: Documented signed opioid trtmnt 

agreemnt min once during opioid trtmnt


Measure #408: Opioid Therapy Follow-up Evaluation: Patient had f/u eval minimum 

every 3 months during opioid therapy


Measure #317: Preventitive Care & Scrn High Bld Press & F/U: Normal blood 

pressure, f/u not required


Measure #128: Body Mass Index (BMI) Screening & Follow-up: BMI documented ABOVE 

normal parameters - f/u documented


Measure #131: Pain Assessment & Follow-up: Pain positive & plan documented, 

Follow-up scheduled


Measure #431: Unhealthy Alcohol Use Preventative Care & Scrn: Patient not 

identified as an unhealthy alcohol user


PQRS Narrative: 


                                        





Smoking Status                   Current every day smoker


Narcotic Agreement Date Signed   07/02/20


Blood Pressure                   127/67


Pain Intensity [Neck]            6


Scale Used                       Numeric (1 - 10)


Hx Alcohol Use (MH)              No








Home Medications: 


Ambulatory Orders





Thyroid,Pork [[place] Thyroid] 150 mg PO DAILY 06/11/14 


Citalopram Hydrobromide [CeleXA] 20 mg PO DAILY 05/02/17 


Multivitamins, Thera [Multivitamin (formulary)] 1 tab PO DAILY 08/22/19 


Pantoprazole Sodium [Protonix] 40 mg PO DAILY 06/18/20 


Butalbital-ASA-Caffein-Codeine [Fiorinal w/Cod -84-30MG] 1 cap PO Q4H PRN 

30 Days #15 cap 07/02/20 


Meloxicam [Mobic] 7.5 mg PO DAILY #30 tab 07/02/20 


Ascorbic Acid [Vitamin C] 1,000 mg PO DAILY 08/20/20 


Dicyclomine [Bentyl] 10 mg PO TID PRN 08/20/20 


Gabapentin [Neurontin] 400 mg PO TID #90 cap 08/26/20 


HYDROcodone/APAP 5-325MG [Norco 5-325] 1 tab PO Q8HR PRN 30 Days #90 tab 

08/26/20 


HYDROcodone/APAP 5-325MG [Norco 5-325] 1 tab PO TID PRN #90 tab 08/26/20 


tiZANidine [Zanaflex] 4 mg PO Q8HR PRN #90 tab 08/26/20 











Controlled Substance Measures





- Controlled Substance Measures


Is patient prescribed a controlled substance at discharge?: Yes

## 2020-10-08 ENCOUNTER — HOSPITAL ENCOUNTER (OUTPATIENT)
Dept: HOSPITAL 47 - RADMAMWWP | Age: 45
Discharge: HOME | End: 2020-10-08
Attending: OBSTETRICS & GYNECOLOGY
Payer: COMMERCIAL

## 2020-10-08 DIAGNOSIS — Z12.31: Primary | ICD-10-CM

## 2020-10-08 PROCEDURE — 77067 SCR MAMMO BI INCL CAD: CPT

## 2020-10-08 PROCEDURE — 77063 BREAST TOMOSYNTHESIS BI: CPT

## 2020-10-12 NOTE — MM
Reason for exam: screening  (asymptomatic).

Last mammogram was performed 1 year and 1 month ago.



History:

Took hormonal contraceptives for 1 year.



Physical Findings:

A clinical breast exam by your physician is recommended on an annual basis and 

results should be correlated with mammographic findings.



MG 3D Screening Mammo W/Cad

Bilateral CC and MLO view(s) were taken.

Prior study comparison: September 4, 2019, bilateral MG 3d screening mammo w/cad. 

November 4, 2017, bilateral MG screening mammo w CAD.

There are scattered fibroglandular densities.

Finding: There are typically benign skin calcifications in the left breast.





ASSESSMENT: Benign, BI-RAD 2



RECOMMENDATION:

Routine screening mammogram of both breasts in 1 year.

## 2020-10-26 ENCOUNTER — HOSPITAL ENCOUNTER (OUTPATIENT)
Dept: HOSPITAL 47 - PNWHC3 | Age: 45
Discharge: HOME | End: 2020-10-26
Attending: ANESTHESIOLOGY
Payer: COMMERCIAL

## 2020-10-26 VITALS — TEMPERATURE: 98.5 F

## 2020-10-26 VITALS — SYSTOLIC BLOOD PRESSURE: 121 MMHG | DIASTOLIC BLOOD PRESSURE: 71 MMHG | HEART RATE: 87 BPM | RESPIRATION RATE: 18 BRPM

## 2020-10-26 DIAGNOSIS — Z79.1: ICD-10-CM

## 2020-10-26 DIAGNOSIS — M54.81: ICD-10-CM

## 2020-10-26 DIAGNOSIS — F11.20: ICD-10-CM

## 2020-10-26 DIAGNOSIS — G43.909: Primary | ICD-10-CM

## 2020-10-26 DIAGNOSIS — F17.200: ICD-10-CM

## 2020-10-26 DIAGNOSIS — Z79.890: ICD-10-CM

## 2020-10-26 DIAGNOSIS — Z79.899: ICD-10-CM

## 2020-10-26 DIAGNOSIS — M47.22: ICD-10-CM

## 2020-10-26 PROCEDURE — 99211 OFF/OP EST MAY X REQ PHY/QHP: CPT

## 2020-10-26 NOTE — P.PAINPG
Subjective


Progress Note Date: 10/26/20





Vianey is a 45-year-old female who presents today with a chief complaint of neck 

pain.  She has chronic migraines as well as chronic neck pain.  She is a 

multiple injections over the years and has been doing pretty well with 

conservative therapy.  She was recently referred to orthopedic surgeon for 

possible cubital tunnel syndrome.  She reports that she was given to carpal 

tunnel injections which significant improved her wrist pain.  She reports the 

numbness tingling has improved.  She still has some neck pain.  She has a lot of

pain when trying to extend the cervical spine.  Cervical spine extension does 

not cause any radiation into the arms.  This causes neck pain.  She has chronic 

migraines which she reports she has about 2 migraines a week that last about 24 

hours.  She finds that heat does improve her pain with a migrainous patient also

uses Fiorinal as needed for migraines.  She understands the frontal can be added

if effects with her hydrocodone and she does not use them together.  She denies 

any side effects from medications.  Her VAS and cervical spine and headaches 

today is about 6 out of 10.  She has pain from the base of the skull that 

radiates up to the top of the head.  She describes as a gripping sensation with 

some pain behind the ears.





Objective





- Vital Signs


Vital signs: 


                                   Vital Signs











Temp  98.5 F   10/26/20 08:38


 


Pulse  87   10/26/20 08:38


 


Resp  18   10/26/20 08:38


 


BP  121/71   10/26/20 08:38


 


Pulse Ox  98   10/26/20 08:38














- Exam





PHYSICAL EXAM:





Constitutional: Awake and alert no distress


Cardiovascular exam: Regular rate, no lower extremity edema, palpable pulses 

bilaterally


Respiratory exam: No audible wheezing, no accessory muscle usage


Abdominal exam: Soft nontender





Muscular skeletal exam: 


  - Cervical spine: Nontender to palpation bilaterally.  Range of motion is 

normal except for extension cervical spine.  She reports she is unable to look 

up because it causes her severe pain at times.  She has some tenderness 

palpation over the occipital nerves bilaterally.  There is no erythema, no 

fluctuance, no masses palpated.


  - Lumbar spine: Preserved lumbar lordosis.  No changes in skin.  Nontender 

palpation bilateral.  Patient has full range of motion in flexion and extension 

as well as lateral sidebending.  Straight leg raise is negative.  Facet loading 

is negative.  Nontender over the SI joints.  CHRISTOPHER Negative, Gaenselons 

negative, SI Joint compression negative. 





Neuro exam: Normal sensation bilateral upper and lower extremities.  Deep tendon

reflexes are 2+ bilaterally.  Sin's is negative





Psychiatric exam: Cooperative, good insight





Assessment and Plan


Assessment: 





#1 chronic migraines


#2 greater occipital neuralgia


#3 cervical spondylosis


#4 cervical radiculopathy


#5 chronic opioid dependence


Plan: 





At this time we'll continue with the current medications.  Will not make any 

changes.  Discussed that using codeine along with hydrocodone can increase risk 

of respiratory depression and death.  Discussed that she should not be using his

medications together.  She should try to avoid using the codeine altogether.  We

will schedule for greater occipital nerve block bilaterally under ultrasound 

guidance.  We discussed that she is ready had an EMG done she should not have a 

repeated.  I've given her a copy of the EMG to give to her orthopedic surgeon.  

She'll also begin participate in physical therapy.





PQRS Measure Charge Sheet


Measure #130: Documentation of Current Meds in Medical Chart: Patient's 

medications documented in chart


Measure #226: Tobacco Use: Screen & Cessation Intervention: Pt not a tobacco 

user


Measure #111: Pneumonia Vaccination: Pneumococcal vaccine administered or 

previously received


Measure #47: Advance Care Plan: Advance care planning discussed & documented, 

plan or surrogate given


Measure #412: Opioid Treatment Agreement: Documented signed opioid trtmnt 

agreemnt min once during opioid trtmnt


Measure #408: Opioid Therapy Follow-up Evaluation: Patient had f/u eval minimum 

every 3 months during opioid therapy


Measure #317: Preventitive Care & Scrn High Bld Press & F/U: Normal blood 

pressure, f/u not required


Measure #128: Body Mass Index (BMI) Screening & Follow-up: BMI documented ABOVE 

normal parameters - f/u documented


Measure #131: Pain Assessment & Follow-up: Pain positive & plan documented


Measure #431: Unhealthy Alcohol Use Preventative Care & Scrn: Patient not 

identified as an unhealthy alcohol user


PQRS Narrative: 


                                        





Smoking Status                   Current every day smoker


Narcotic Agreement Date Signed   07/02/20


Blood Pressure                   121/71


Pain Intensity [Neck]            5


Scale Used                       Numeric (1 - 10)


Hx Alcohol Use (MH)              No








Home Medications: 


Ambulatory Orders





Thyroid,Pork [[place] Thyroid] 150 mg PO DAILY 06/11/14 


Citalopram Hydrobromide [CeleXA] 20 mg PO DAILY 05/02/17 


Multivitamins, Thera [Multivitamin (formulary)] 1 tab PO DAILY 08/22/19 


Pantoprazole Sodium [Protonix] 40 mg PO DAILY 06/18/20 


Ascorbic Acid [Vitamin C] 1,000 mg PO DAILY 08/20/20 


Dicyclomine [Bentyl] 10 mg PO TID PRN 08/20/20 


Ibuprofen [Motrin Ib] 600 - 800 mg PO AS DIRECTED PRN 10/22/20 


Butalbital-ASA-Caffein-Codeine [Fiorinal w/Cod -10-30MG] 1 cap PO Q4H PRN 

30 Days #15 cap 10/26/20 


Gabapentin [Neurontin] 400 mg PO TID #90 cap 10/26/20 


HYDROcodone/APAP 5-325MG [Norco 5-325] 1 tab PO Q8HR PRN 30 Days #90 tab 

10/26/20 


HYDROcodone/APAP 5-325MG [Norco 5-325] 1 tab PO TID PRN #90 tab 10/26/20 


Meloxicam [Mobic] 7.5 mg PO DAILY #30 tab 10/26/20 


tiZANidine [Zanaflex] 4 mg PO Q8HR PRN #90 tab 10/26/20 











Controlled Substance Measures





- Controlled Substance Measures


Is patient prescribed a controlled substance at discharge?: Yes


When asked, does pt state using other controlled substances?: No


If prescribed controlled substance>3 days was MAPS reviewed?: Yes


If Rx opioid, was Start Talking consent form obtained?: Yes


If opioid is for acute pain is fill amount 7 days or less?: No


Was information provided regarding opioid addiction?: Yes

## 2020-12-21 ENCOUNTER — HOSPITAL ENCOUNTER (OUTPATIENT)
Dept: HOSPITAL 47 - LABWHC1 | Age: 45
Discharge: HOME | End: 2020-12-21
Attending: INTERNAL MEDICINE
Payer: COMMERCIAL

## 2020-12-21 ENCOUNTER — HOSPITAL ENCOUNTER (OUTPATIENT)
Dept: HOSPITAL 47 - PNWHC3 | Age: 45
Discharge: HOME | End: 2020-12-21
Attending: ANESTHESIOLOGY
Payer: COMMERCIAL

## 2020-12-21 VITALS
TEMPERATURE: 98 F | RESPIRATION RATE: 18 BRPM | DIASTOLIC BLOOD PRESSURE: 69 MMHG | SYSTOLIC BLOOD PRESSURE: 118 MMHG | HEART RATE: 86 BPM

## 2020-12-21 DIAGNOSIS — Z79.899: ICD-10-CM

## 2020-12-21 DIAGNOSIS — M47.26: ICD-10-CM

## 2020-12-21 DIAGNOSIS — R53.83: ICD-10-CM

## 2020-12-21 DIAGNOSIS — M47.812: ICD-10-CM

## 2020-12-21 DIAGNOSIS — Z79.891: ICD-10-CM

## 2020-12-21 DIAGNOSIS — M79.18: ICD-10-CM

## 2020-12-21 DIAGNOSIS — M50.30: ICD-10-CM

## 2020-12-21 DIAGNOSIS — E03.8: Primary | ICD-10-CM

## 2020-12-21 DIAGNOSIS — M51.16: ICD-10-CM

## 2020-12-21 DIAGNOSIS — G89.29: Primary | ICD-10-CM

## 2020-12-21 LAB — VIT B12 SERPL-MCNC: 277 PG/ML (ref 200–944)

## 2020-12-21 PROCEDURE — 82306 VITAMIN D 25 HYDROXY: CPT

## 2020-12-21 PROCEDURE — 99211 OFF/OP EST MAY X REQ PHY/QHP: CPT

## 2020-12-21 PROCEDURE — 82607 VITAMIN B-12: CPT

## 2020-12-21 PROCEDURE — 82533 TOTAL CORTISOL: CPT

## 2020-12-21 PROCEDURE — 84146 ASSAY OF PROLACTIN: CPT

## 2020-12-21 PROCEDURE — 84443 ASSAY THYROID STIM HORMONE: CPT

## 2020-12-21 PROCEDURE — 36415 COLL VENOUS BLD VENIPUNCTURE: CPT

## 2021-01-11 NOTE — P.PN
Subjective


Progress Note Date: 12/21/20





This is a follow-up visit for this 45 year old female with a chronic history of 

severe neck and back pain, she has been diagnosed with cervical spondylosis and 

cervical herniated disc disease, Status post  posterior cervical decompression, 

, cervical myofascial pain syndrome, lumbar radicular pain, she has been managed

with a combination of medications and interventional pain procedures.  She is 

also taking Neurontin 400 mg 3 times a day,meloxicam and 0.5 mg by mouth daily, 

tizanidine 4 mg 3 times a day, and Fiorinal when necessary for headache. she 

denies any side effect of the medication.   she denies any excessive drowsiness 

or sleepiness, she denies any suicidal ideation. 


Description been diagnosed with the carpal tunnel syndrome bilaterally and she 

has bilateral carpal tunnel release done recently, she feels that the numbness 

and tingling sensation in her hands improved significantly, she is currently 

complaining of some back pain issues which is localized in the low back area


Review of systems is negative for chest pain, shortness of breath, changes in 

vision, changes in hearing, new onset weakness, abdominal pain, diarrhea, 

extreme fatigue, malaise, fever, skin changes, homicidal or suicidal ideation, 

or bowel or bladder incontinence.  She does endorse anxiety.  She also endorse 

bilateral numbness and tingling in hands, this is worse at night, associated 

with hand swelling.








Objective





- Exam








  


    -Constitutiona       : Cooperative , not in acute distress .


    -HEENT                :  nech :  supple ,  no Lymphadenopathy  , normal  

thyroid  size .


                               :   eyes  :  no ptosis , no icterus,  no 

photophobia .                                                                   

                                                                                

                                                                                

                                                                                

                                                             


    - neurologic         :   Cranial nerve II   to  XII  intact ,  no   focal 

neurological deffecit  .


    -psychatric          : alert ,  oriented  X 3  ,   appropriate affect   , 

intact judgment  and insight  .  


    -Lymphatic          :    no Lymphadenopathy .


   - musculoskeltal   :     


                                 Cervical Spine 


                                         motor  stregnth in the deltoid and 

biceps,   normal   right side    ,  normal  Left side


                                         motor  stregnth biceps and the wrist 

extensors   normal right side ,normal left side .


                                         motor stregnth in the triceps muscle . 

normal  Right side , normal  Left side  


                                         deep tendon reflexes  normal   at the 

biceps ,   normal  at Brachioradialis , normal  at triceps.


                                         cervical facet loading test= Positive 

Bilateral


                                         Myofascial pain in the cervical para 

spinal muscles


                                   Lumber spine


                                         moter stegnth lower extremities ,thigh 

and legs  5/5 Right side ,  5/5  Left side 


                                         Myofascial pain in the lumbar 

paraspinal muscles





 Lumbar MRI done at [facility] shows multilevel mild degenerative disc 

disease, facet hypertrophy in the lower lumbar spine.








Assessment and Plan


Plan: 








Assessment and plan=


   chronic neck pain secondary to cervical herniated disc disease  , cervical 

spondylosis, myofascial pain syndrome  .


   Myofascial pain syndrome cervical trapezius suprascapular and right lumbar 

region


   lumbar radicular pain, lumbar degenerative disc disease, lumbar spondylosis


   chronic and current  use of high-risk medication (opioids)


   Patient denies any side effects of the current pain medication  and the 

current treatment/medication helping the  patient to do activity of daily living

,


   MAPS Reviewed and it was appropriate .  It is reviewed


   Medication managements= patient will be given prescription refills for Norco 

5/325 every 8 hours dispense 90 with 1 refill 


                                  Decrease Neurontin to  ,Neurontin 300 mg every

8 hours dispense 90 with 1 refill


                                  Continue Mobic 7.5 every daily dispense 30 

with one refill, Zanaflex 4 mg 3 times a day dispense 90 with 1 refill


Time with Patient: Less than 30





PQRS Measure Charge Sheet


Measure #130: Documentation of Current Meds in Medical Chart: Patient's 

medications documented in chart


Measure #226: Tobacco Use: Screen & Cessation Intervention: Pt not a tobacco 

user


Measure #111: Pneumonia Vaccination: Pneumococcal vaccine NOT administered or 

previously given


Measure #47: Advance Care Plan: Advance care planning discussed & documented, pt

chose/unable to give


Measure #412: Opioid Treatment Agreement: Documented signed opioid trtmnt 

agreemnt min once during opioid trtmnt


Measure #408: Opioid Therapy Follow-up Evaluation: Patient had f/u eval minimum 

every 3 months during opioid therapy


Measure #317: Preventitive Care & Scrn High Bld Press & F/U: Normal blood 

pressure, f/u not required


Measure #128: Body Mass Index (BMI) Screening & Follow-up: BMI documented ABOVE 

normal parameters - f/u documented


Measure #131: Pain Assessment & Follow-up: Pain positive & plan documented, 

Follow-up scheduled


Measure #431: Unhealthy Alcohol Use Preventative Care & Scrn: Patient not 

identified as an unhealthy alcohol user


PQRS Narrative: 


Time with Patient: Less than 30

## 2021-02-15 ENCOUNTER — HOSPITAL ENCOUNTER (OUTPATIENT)
Dept: HOSPITAL 47 - PNWHC3 | Age: 46
Discharge: HOME | End: 2021-02-15
Attending: ANESTHESIOLOGY
Payer: COMMERCIAL

## 2021-02-15 VITALS
TEMPERATURE: 99.2 F | HEART RATE: 85 BPM | DIASTOLIC BLOOD PRESSURE: 72 MMHG | RESPIRATION RATE: 18 BRPM | SYSTOLIC BLOOD PRESSURE: 119 MMHG

## 2021-02-15 DIAGNOSIS — Z79.899: ICD-10-CM

## 2021-02-15 DIAGNOSIS — M51.16: ICD-10-CM

## 2021-02-15 DIAGNOSIS — M47.812: ICD-10-CM

## 2021-02-15 DIAGNOSIS — M47.26: ICD-10-CM

## 2021-02-15 DIAGNOSIS — M50.30: Primary | ICD-10-CM

## 2021-02-15 DIAGNOSIS — G89.29: ICD-10-CM

## 2021-02-15 DIAGNOSIS — M79.18: ICD-10-CM

## 2021-02-15 PROCEDURE — 99212 OFFICE O/P EST SF 10 MIN: CPT

## 2021-02-15 PROCEDURE — 80307 DRUG TEST PRSMV CHEM ANLYZR: CPT

## 2021-02-15 NOTE — P.PN
Subjective


Progress Note Date: 02/15/21








This is a follow-up visit for this 45 year old female with a chronic history of 

severe neck and back pain, she has been diagnosed with cervical spondylosis and 

cervical herniated disc disease, Status post  posterior cervical decompression, 

cervical myofascial pain syndrome, lumbar radicular pain, she has been managed 

with a combination of medications and interventional pain procedures.  She is 

also taking Neurontin 300 mg 3 times a day,meloxicam and 7.5 mg by mouth daily, 

tizanidine 4 mg 3 times a day, and Fiorinal when necessary for headache.  Norco 

5/325 every 8 hours when necessary she denies any side effect of the medication.

  she denies any excessive drowsiness or sleepiness, she denies any suicidal 

ideation. 


Description been diagnosed with the carpal tunnel syndrome bilaterally and she 

has bilateral carpal tunnel release done recently, she feels that the numbness 

and tingling sensation in her hands improved significantly, she is currently 

complaining of some back pain issues which is localized in the low back area


Review of systems is negative for chest pain, shortness of breath, changes in 

vision, changes in hearing, new onset weakness, abdominal pain, diarrhea, 

extreme fatigue, malaise, fever, skin changes, homicidal or suicidal ideation, 

or bowel or bladder incontinence.  She does endorse anxiety.  She also endorse 

bilateral numbness and tingling in hands, this is worse at night, associated 

with hand swelling.





- Exam


    -Constitutiona       : Cooperative , not in acute distress .


    -HEENT                :  nech :  supple ,  no Lymphadenopathy  , normal  

thyroid  size .


                               :   eyes  :  no ptosis , no icterus,  no 

photophobia .                                                                   

                                                                                

                                                                                

                                                                                

                                                             


    - neurologic         :   Cranial nerve II   to  XII  intact ,  no   focal 

neurological deffecit  .


    -psychatric          : alert ,  oriented  X 3  ,   appropriate affect   , 

intact judgment  and insight  .  


    -Lymphatic          :    no Lymphadenopathy .


   - musculoskeltal   :     


                                 Cervical Spine 


                                         motor  stregnth in the deltoid and 

biceps,   normal   right side    ,  normal  Left side


                                         motor  stregnth biceps and the wrist 

extensors   normal right side ,normal left side .


                                         motor stregnth in the triceps muscle . 

normal  Right side , normal  Left side  


                                         deep tendon reflexes  normal   at the 

biceps ,   normal  at Brachioradialis , normal  at triceps.


                                         cervical facet loading test= Positive 

Bilateral


                                         Myofascial pain in the cervical para 

spinal muscles


                                   Lumber spine


                                         moter stegnth lower extremities ,thigh 

and legs  5/5 Right side ,  5/5  Left side 


                                         Myofascial pain in the lumbar 

paraspinal muscles





 Lumbar MRI done at [facility] shows multilevel mild degenerative disc 

disease, facet hypertrophy in the lower lumbar spine





Assessment and plan=


   chronic neck pain secondary to cervical herniated disc disease  , cervical 

spondylosis, myofascial pain syndrome  .


   Myofascial pain syndrome cervical trapezius suprascapular and right lumbar 

region


   lumbar radicular pain, lumbar degenerative disc disease, lumbar spondylosis


   chronic and current  use of high-risk medication (opioids)


   Patient denies any side effects of the current pain medication  and the 

current treatment/medication helping the  patient to do activity of daily living

,


   MAPS Reviewed and it was appropriate .  It is reviewed


   Medication managements= patient will be given prescription refills for Norco 

5/325 every 8 hours dispense 90 with 1 refill 


                                  Decrease Neurontin to  ,Neurontin 300 mg every

12 hours dispense 60 with 1 refill


                                  Continue Mobic 7.5 every daily dispense 30 

with one refill, Zanaflex 4 mg 3 times a day dispense 90 with 1 refill


                                  fioriol one tablet by mouth every 12 hours 

dispense 10 with one refill


                                  Urine drug screen ordered today


Time with Patient: Less than 30





PQRS Measure Charge Sheet


Measure #130: Documentation of Current Meds in Medical Chart: Patient's 

medications documented in chart


Measure #226: Tobacco Use: Screen & Cessation Interventio= patient is to about 

the use of and interventions given


Measure #111: Pneumonia Vaccination: Pneumococcal vaccine NOT administered or 

previously given


Measure #47: Advance Care Plan: Advance care planning discussed & documented, pt

chose/unable to give


Measure #412: Opioid Treatment Agreement: Documented signed opioid trtmnt 

agreemnt min once during opioid trtmnt


Measure #408: Opioid Therapy Follow-up Evaluation: Patient had f/u eval minimum 

every 3 months during opioid therapy


Measure #317: Preventitive Care & Scrn High Bld Press & F/U: Normal blood 

pressure, f/u not required


Measure #128: Body Mass Index (BMI) Screening & Follow-up: BMI documented ABOVE 

normal parameters - f/u documented


Measure #131: Pain Assessment & Follow-up: Pain positive & plan documented, 

Follow-up scheduled


Measure #431: Unhealthy Alcohol Use Preventative Care & Scrn: Patient not 

identified as an unhealthy alcohol user


PQRS Narrative: 


Time with Patient: Less than 30








Objective





- Vital Signs


Vital signs: 


                                   Vital Signs











Temp  99.2 F   02/15/21 07:48


 


Pulse  85   02/15/21 07:48


 


Resp  18   02/15/21 07:48


 


BP  119/72   02/15/21 07:48


 


Pulse Ox  98   02/15/21 07:48

## 2021-04-13 ENCOUNTER — HOSPITAL ENCOUNTER (OUTPATIENT)
Dept: HOSPITAL 47 - LABWHC1 | Age: 46
Discharge: HOME | End: 2021-04-13
Attending: INTERNAL MEDICINE
Payer: COMMERCIAL

## 2021-04-13 DIAGNOSIS — R53.83: ICD-10-CM

## 2021-04-13 DIAGNOSIS — E55.9: Primary | ICD-10-CM

## 2021-04-13 DIAGNOSIS — E03.8: ICD-10-CM

## 2021-04-13 PROCEDURE — 82533 TOTAL CORTISOL: CPT

## 2021-04-13 PROCEDURE — 84443 ASSAY THYROID STIM HORMONE: CPT

## 2021-04-13 PROCEDURE — 36415 COLL VENOUS BLD VENIPUNCTURE: CPT

## 2021-04-13 PROCEDURE — 82306 VITAMIN D 25 HYDROXY: CPT

## 2021-04-13 PROCEDURE — 82024 ASSAY OF ACTH: CPT

## 2021-04-19 ENCOUNTER — HOSPITAL ENCOUNTER (OUTPATIENT)
Dept: HOSPITAL 47 - PNWHC3 | Age: 46
End: 2021-04-19
Attending: ANESTHESIOLOGY
Payer: COMMERCIAL

## 2021-04-19 VITALS
HEART RATE: 78 BPM | TEMPERATURE: 98.2 F | RESPIRATION RATE: 16 BRPM | DIASTOLIC BLOOD PRESSURE: 73 MMHG | SYSTOLIC BLOOD PRESSURE: 114 MMHG

## 2021-04-19 DIAGNOSIS — M50.90: ICD-10-CM

## 2021-04-19 DIAGNOSIS — M79.18: ICD-10-CM

## 2021-04-19 DIAGNOSIS — M47.26: ICD-10-CM

## 2021-04-19 DIAGNOSIS — Z79.899: ICD-10-CM

## 2021-04-19 DIAGNOSIS — F17.200: ICD-10-CM

## 2021-04-19 DIAGNOSIS — Z79.891: ICD-10-CM

## 2021-04-19 DIAGNOSIS — F41.9: ICD-10-CM

## 2021-04-19 DIAGNOSIS — G89.29: ICD-10-CM

## 2021-04-19 DIAGNOSIS — M51.16: ICD-10-CM

## 2021-04-19 DIAGNOSIS — M47.812: Primary | ICD-10-CM

## 2021-04-19 PROCEDURE — 99211 OFF/OP EST MAY X REQ PHY/QHP: CPT

## 2021-04-19 NOTE — P.PN
Subjective


Progress Note Date: 04/19/21





This is a follow-up visit for this 45 year old female with a chronic history of 

severe neck and low  back pain, she has been diagnosed with cervical spondylosis

and cervical herniated disc disease, Status post  posterior cervical 

decompression, cervical myofascial pain syndrome, lumbar radicular pain, she has

been managed with a combination of medications and interventional pain 

procedures.  She is also taking Neurontin 300 mg 2 times a day,meloxicam and 7.5

mg by mouth daily, tizanidine 4 mg 3 times a day, and Fiorinal when necessary 

for headache.  Norco 5/325 every 8 hours when necessary she reported that 

Neurontin may Sleepy, for  this reason, she is taking it once or twice a day, 

she denies any suicidal ideation. 


Description been diagnosed with the carpal tunnel syndrome bilaterally and she 

has bilateral carpal tunnel release done recently, she feels that the numbness 

and tingling sensation in her hands improved significantly, she is currently 

complaining of some back pain issues which is localized in the low back area


Review of systems is negative for chest pain, shortness of breath, changes in 

vision, changes in hearing, new onset weakness, abdominal pain, diarrhea, 

extreme fatigue, malaise, fever, skin changes, homicidal or suicidal ideation, 

or bowel or bladder incontinence.  She does endorse anxiety.  She also endorse 

bilateral numbness and tingling in hands, this is worse at night, associated 

with hand swelling.





- Exam


    -Constitutiona       : Cooperative , not in acute distress .


    -HEENT                :  nech :  supple ,  no Lymphadenopathy  , normal  

thyroid  size .


                               :   eyes  :  no ptosis , no icterus,  no 

photophobia .                                                                   

                                                                                

                                                                                

                                                                                

                                                             


    - neurologic         :   Cranial nerve II   to  XII  intact ,  no   focal 

neurological deffecit  .


    -psychatric          : alert ,  oriented  X 3  ,   appropriate affect   , 

intact judgment  and insight  .  


    -Lymphatic          :    no Lymphadenopathy .


   - musculoskeltal   :     


                                 Cervical Spine 


                                         motor  stregnth in the deltoid and 

biceps,   normal   right side    ,  normal  Left side


                                         motor  stregnth biceps and the wrist 

extensors   normal right side ,normal left side .


                                         motor stregnth in the triceps muscle . 

normal  Right side , normal  Left side  


                                         deep tendon reflexes  normal   at the 

biceps ,   normal  at Brachioradialis , normal  at triceps.


                                         cervical facet loading test= Positive 

Bilateral


                                         Myofascial pain in the cervical para 

spinal muscles


                                   Lumber spine


                                         moter stegnth lower extremities ,thigh 

and legs  5/5 Right side ,  5/5  Left side 


                                         Myofascial pain in the lumbar 

paraspinal muscles





 Lumbar MRI done at [facility] shows multilevel mild degenerative disc 

disease, facet hypertrophy in the lower lumbar spine





Assessment and plan=


   chronic neck pain secondary to cervical herniated disc disease  , cervical 

spondylosis, myofascial pain syndrome  .


   Myofascial pain syndrome cervical trapezius suprascapular and right lumbar 

region


   lumbar radicular pain, lumbar degenerative disc disease, lumbar spondylosis


   chronic and current  use of high-risk medication (opioids)


   Patient denies any side effects of the current pain medication  and the 

current treatment/medication helping the  patient to do activity of daily living

,


   MAPS Reviewed and it was appropriate .  It is reviewed


   Medication managements= patient will be given prescription refills for Norco 

5/325 every 8 hours dispense 90 with 1 refill 


                                  Decrease Neurontin to  ,Neurontin 100 mg tab (

2 Tab  every 12 ) hours dispense 120 with 1 refill


                                  Continue Mobic 7.5 every daily dispense 30 

with one refill, Zanaflex 4 mg 3 times a day dispense 90 with 1 refill


                                  fioriol one tablet by mouth every 12 hours 

dispense 20 with one refill


                                  Urine drug screen was okay


Time with Patient: Less than 30





PQRS Measure Charge Sheet


Measure #130: Documentation of Current Meds in Medical Chart: Patient's 

medications documented in chart


Measure #226: Tobacco Use: Screen & Cessation Interventio= patient is to about 

the use of and interventions given


Measure #111: Pneumonia Vaccination: Pneumococcal vaccine NOT administered or 

previously given


Measure #47: Advance Care Plan: Advance care planning discussed & documented, pt

chose/unable to give


Measure #412: Opioid Treatment Agreement: Documented signed opioid trtmnt 

agreemnt min once during opioid trtmnt


Measure #408: Opioid Therapy Follow-up Evaluation: Patient had f/u eval minimum 

every 3 months during opioid therapy


Measure #317: Preventitive Care & Scrn High Bld Press & F/U: Normal blood 

pressure, f/u not required


Measure #128: Body Mass Index (BMI) Screening & Follow-up: BMI documented ABOVE 

normal parameters - f/u documented


Measure #131: Pain Assessment & Follow-up: Pain positive & plan documented, 

Follow-up scheduled


Measure #431: Unhealthy Alcohol Use Preventative Care & Scrn: Patient not 

identified as an unhealthy alcohol u





Objective





- Vital Signs


Vital signs: 


                                   Vital Signs











Temp  98.2 F   04/19/21 10:07


 


Pulse  78   04/19/21 10:07


 


Resp  16   04/19/21 10:07


 


BP  114/73   04/19/21 10:07


 


Pulse Ox  100   04/19/21 10:07

## 2021-05-04 ENCOUNTER — HOSPITAL ENCOUNTER (OUTPATIENT)
Dept: HOSPITAL 47 - RADUSWWP | Age: 46
Discharge: HOME | End: 2021-05-04
Attending: INTERNAL MEDICINE
Payer: COMMERCIAL

## 2021-05-04 DIAGNOSIS — R16.0: ICD-10-CM

## 2021-05-04 DIAGNOSIS — K76.0: Primary | ICD-10-CM

## 2021-05-04 PROCEDURE — 76700 US EXAM ABDOM COMPLETE: CPT

## 2021-05-04 NOTE — US
EXAMINATION TYPE: US abdomen complete

 

DATE OF EXAM: 5/4/2021

 

COMPARISON: NONE

 

CLINICAL HISTORY: R10.11 Right Upper Quad Abdominal Pain. Pt states RUQ pain

 

EXAM MEASUREMENTS:

 

Liver Length:  20.1 cm   

Gallbladder Wall:  0.2 cm   

CBD:  0.4 cm

Spleen:  9.2 cm   

Right Kidney:  11.9 x 5.4 x 6.0 cm 

Left Kidney:  10.6 x 6.0 x 4.7 cm   

 

 

 

Pancreas:  wnl, tail obscured by overlying bowel gas

Liver:  Enlarged, heterogeneous, difficult to penetrate  

Gallbladder:  wnl

**Evidence for sonographic Gonzalez's sign:  No

CBD:  wnl 

Spleen:  wnl   

Right Kidney:  wnl   

Left Kidney:  wnl   

Upper IVC:  wnl  

Abd Aorta:  wnl

 

IMPRESSION: 

1. Mild fatty infiltration of liver. Hepatomegaly is present.

## 2021-06-14 ENCOUNTER — HOSPITAL ENCOUNTER (OUTPATIENT)
Dept: HOSPITAL 47 - PNWHC3 | Age: 46
End: 2021-06-14
Attending: ANESTHESIOLOGY
Payer: COMMERCIAL

## 2021-06-14 VITALS
SYSTOLIC BLOOD PRESSURE: 115 MMHG | HEART RATE: 109 BPM | RESPIRATION RATE: 18 BRPM | TEMPERATURE: 98.9 F | DIASTOLIC BLOOD PRESSURE: 70 MMHG

## 2021-06-14 DIAGNOSIS — M47.26: ICD-10-CM

## 2021-06-14 DIAGNOSIS — F17.200: ICD-10-CM

## 2021-06-14 DIAGNOSIS — M79.18: ICD-10-CM

## 2021-06-14 DIAGNOSIS — Z88.2: ICD-10-CM

## 2021-06-14 DIAGNOSIS — M47.812: ICD-10-CM

## 2021-06-14 DIAGNOSIS — M51.16: ICD-10-CM

## 2021-06-14 DIAGNOSIS — G89.29: ICD-10-CM

## 2021-06-14 DIAGNOSIS — Z79.891: ICD-10-CM

## 2021-06-14 DIAGNOSIS — M50.20: Primary | ICD-10-CM

## 2021-06-14 PROCEDURE — 99211 OFF/OP EST MAY X REQ PHY/QHP: CPT

## 2021-06-14 NOTE — P.PN
Subjective


Progress Note Date: 06/14/21








This is a follow-up visit for this 45 year old female with a chronic history of 

severe neck and low  back pain, she has been diagnosed with cervical spondylosis

and cervical herniated disc disease, Status post  posterior cervical 

decompression, cervical myofascial pain syndrome, lumbar radicular pain, she has

been managed with a combination of medications and interventional pain 

procedures.  She is also taking Neurontin 300 mg 2 times a day,meloxicam and 7.5

mg by mouth daily, tizanidine 4 mg 3 times a day, and Fiorinal when necessary 

for headache.  Norco 5/325 every 8 hours when necessary she reported that 

Neurontin may Sleepy, for  this reason, she is taking it once or twice a day, 

she denies any suicidal ideation. , she feels that the numbness and tingling 

sensation in her hands improved significantly, she is currently complaining of 

some back pain issues which is localized in the low back area


Review of systems is negative for chest pain, shortness of breath, changes in 

vision, changes in hearing, new onset weakness, abdominal pain, diarrhea, 

extreme fatigue, malaise, fever, skin changes, homicidal or suicidal ideation, 

or bowel or bladder incontinence.  She does endorse anxiety.  She also endorse 

bilateral numbness and tingling in hands, this is worse at night, associated 

with hand swelling.





- Exam


    -Constitutiona       : Cooperative , not in acute distress .


    -HEENT                :  nech :  supple ,  no Lymphadenopathy  , normal  

thyroid  size .


                               :   eyes  :  no ptosis , no icterus,  no 

photophobia .                                                                   

                                                                                

                                                                                

                                                                                

                                                             


    - neurologic         :   Cranial nerve II   to  XII  intact ,  no   focal 

neurological deffecit  .


    -psychatric          : alert ,  oriented  X 3  ,   appropriate affect   , 

intact judgment  and insight  .  


    -Lymphatic          :    no Lymphadenopathy .


   - musculoskeltal   :     


                                 Cervical Spine 


                                         motor  stregnth in the deltoid and 

biceps,   normal   right side    ,  normal  Left side


                                         motor  stregnth biceps and the wrist 

extensors   normal right side ,normal left side .


                                         motor stregnth in the triceps muscle . 

normal  Right side , normal  Left side  


                                         deep tendon reflexes  normal   at the 

biceps ,   normal  at Brachioradialis , normal  at triceps.


                                         cervical facet loading test= Positive 

Bilateral


                                         Myofascial pain in the cervical para 

spinal muscles


                                   Lumber spine


                                         moter stegnth lower extremities ,thigh 

and legs  5/5 Right side ,  5/5  Left side 


                                         Myofascial pain in the lumbar 

paraspinal muscles





 Lumbar MRI done at [facility] shows multilevel mild degenerative disc 

disease, facet hypertrophy in the lower lumbar spine





Assessment and plan=


   chronic neck pain secondary to cervical herniated disc disease  , cervical 

spondylosis, myofascial pain syndrome  .


   Myofascial pain syndrome cervical trapezius suprascapular and right lumbar 

region


   lumbar radicular pain, lumbar degenerative disc disease, lumbar spondylosis


   chronic and current  use of high-risk medication (opioids)


   Patient denies any side effects of the current pain medication  and the 

current treatment/medication helping the  patient to do activity of daily living

,


   MAPS Reviewed and it was appropriate .  It is reviewed


   Medication managements= patient will be given prescription refills for Norco 

5/325 every 8 hours dispense 90 with 1 refill 


                                  decrease  Neurontin to  100 mg 3 times a day 

with 1 refill


                                  Continue Mobic 7.5 every daily dispense 30 

with one refill, Zanaflex 4 mg 3 times a day dispense 90 with 1 refill


                                  fioriol one tablet by mouth every 12 hours 

dispense 20 with one refill


                                  Urine drug screen was okay


Time with Patient: Less than 30





PQRS Measure Charge Sheet


Measure #130: Documentation of Current Meds in Medical Chart: Patient's 

medications documented in chart


Measure #226: Tobacco Use: Screen & Cessation Interventio= patient is to about 

the use of and interventions given


Measure #111: Pneumonia Vaccination: Pneumococcal vaccine NOT administered or 

previously given


Measure #47: Advance Care Plan: Advance care planning discussed & documented, pt

chose/unable to give


Measure #412: Opioid Treatment Agreement: Documented signed opioid trtmnt 

agreemnt min once during opioid trtmnt


Measure #408: Opioid Therapy Follow-up Evaluation: Patient had f/u eval minimum 

every 3 months during opioid therapy


Measure #317: Preventitive Care & Scrn High Bld Press & F/U: Normal blood 

pressure, f/u not required


Measure #128: Body Mass Index (BMI) Screening & Follow-up: BMI documented ABOVE 

normal parameters - f/u documented


Measure #131: Pain Assessment & Follow-up: Pain positive & plan documented, 

Follow-up scheduled


Measure #431: Unhealthy Alcohol Use Preventative Care & Scrn: Patient not 

identified as an unhealthy alcohol u








Objective





- Vital Signs


Vital signs: 


                                   Vital Signs











Temp  98.9 F   06/14/21 08:11


 


Pulse  109 H  06/14/21 08:11


 


Resp  18   06/14/21 08:11


 


BP  115/70   06/14/21 08:11


 


Pulse Ox  97   06/14/21 08:11

## 2021-07-02 ENCOUNTER — HOSPITAL ENCOUNTER (OUTPATIENT)
Dept: HOSPITAL 47 - LABWHC1 | Age: 46
Discharge: HOME | End: 2021-07-02
Attending: INTERNAL MEDICINE
Payer: COMMERCIAL

## 2021-07-02 DIAGNOSIS — E03.8: ICD-10-CM

## 2021-07-02 DIAGNOSIS — E55.9: Primary | ICD-10-CM

## 2021-07-02 DIAGNOSIS — R53.83: ICD-10-CM

## 2021-07-02 LAB
ALBUMIN SERPL-MCNC: 4.2 G/DL (ref 3.8–4.9)
ALBUMIN/GLOB SERPL: 2 G/DL (ref 1.6–3.17)
ALP SERPL-CCNC: 106 U/L (ref 41–126)
ALT SERPL-CCNC: 39 U/L (ref 8–44)
ANION GAP SERPL CALC-SCNC: 6 MMOL/L (ref 4–12)
AST SERPL-CCNC: 32 U/L (ref 13–35)
BUN SERPL-SCNC: 13 MG/DL (ref 9–27)
BUN/CREAT SERPL: 18.57 RATIO (ref 12–20)
CALCIUM SPEC-MCNC: 8.7 MG/DL (ref 8.7–10.3)
CHLORIDE SERPL-SCNC: 108 MMOL/L (ref 96–109)
CO2 SERPL-SCNC: 27 MMOL/L (ref 21.6–31.8)
FSH SERPL-ACNC: 16.2 MIU/ML
GLOBULIN SER CALC-MCNC: 2.1 G/DL (ref 1.6–3.3)
GLUCOSE SERPL-MCNC: 130 MG/DL (ref 70–110)
POTASSIUM SERPL-SCNC: 3.9 MMOL/L (ref 3.5–5.5)
PROT SERPL-MCNC: 6.3 G/DL (ref 6.2–8.2)
SODIUM SERPL-SCNC: 141 MMOL/L (ref 135–145)
VIT B12 SERPL-MCNC: 316 PG/ML (ref 200–944)

## 2021-07-02 PROCEDURE — 80053 COMPREHEN METABOLIC PANEL: CPT

## 2021-07-02 PROCEDURE — 82306 VITAMIN D 25 HYDROXY: CPT

## 2021-07-02 PROCEDURE — 83970 ASSAY OF PARATHORMONE: CPT

## 2021-07-02 PROCEDURE — 82607 VITAMIN B-12: CPT

## 2021-07-02 PROCEDURE — 84443 ASSAY THYROID STIM HORMONE: CPT

## 2021-07-02 PROCEDURE — 36415 COLL VENOUS BLD VENIPUNCTURE: CPT

## 2021-07-02 PROCEDURE — 83001 ASSAY OF GONADOTROPIN (FSH): CPT

## 2021-07-02 PROCEDURE — 84146 ASSAY OF PROLACTIN: CPT

## 2021-07-02 PROCEDURE — 83002 ASSAY OF GONADOTROPIN (LH): CPT

## 2021-08-23 ENCOUNTER — HOSPITAL ENCOUNTER (OUTPATIENT)
Dept: HOSPITAL 47 - PNWHC3 | Age: 46
End: 2021-08-23
Attending: STUDENT IN AN ORGANIZED HEALTH CARE EDUCATION/TRAINING PROGRAM
Payer: COMMERCIAL

## 2021-08-23 VITALS
RESPIRATION RATE: 18 BRPM | TEMPERATURE: 98.6 F | HEART RATE: 81 BPM | DIASTOLIC BLOOD PRESSURE: 76 MMHG | SYSTOLIC BLOOD PRESSURE: 115 MMHG

## 2021-08-23 DIAGNOSIS — M79.18: ICD-10-CM

## 2021-08-23 DIAGNOSIS — Z79.891: ICD-10-CM

## 2021-08-23 DIAGNOSIS — M47.816: ICD-10-CM

## 2021-08-23 DIAGNOSIS — M51.36: ICD-10-CM

## 2021-08-23 DIAGNOSIS — M50.20: Primary | ICD-10-CM

## 2021-08-23 DIAGNOSIS — Z88.2: ICD-10-CM

## 2021-08-23 DIAGNOSIS — F17.200: ICD-10-CM

## 2021-08-23 DIAGNOSIS — M47.812: ICD-10-CM

## 2021-08-23 PROCEDURE — 99211 OFF/OP EST MAY X REQ PHY/QHP: CPT

## 2021-08-23 NOTE — P.PAINPG
Subjective


Progress Note Date: 08/23/21





This is a follow-up visit for this 45 year old female with a chronic history of 

severe neck and low  back pain, she has been diagnosed with cervical spondylosis

and cervical herniated disc disease, Status post  posterior cervical 

decompression, cervical myofascial pain syndrome, lumbar radicular pain, she has

been managed with a combination of medications and interventional pain 

procedures.  She is also taking Neurontin 300 mg 2 times a day,meloxicam and 7.5

mg by mouth daily, tizanidine 4 mg 3 times a day, and Fiorinal when necessary 

for headache.  Norco 5/325 every 8 hours when necessary she reported that 

Neurontin makes her drowsy, for  this reason, she is taking it once or twice a 

day, she denies any suicidal ideation. , she feels that the numbness and 

tingling sensation in her hands improved significantly, she is currently 

complaining of some back pain issues which is localized in the low back area but

overall she is doing well.  At this time she is very satisfied with her current 

medication regimen and her health has been the same.








Review of systems is negative for chest pain, shortness of breath, changes in 

vision, changes in hearing, new onset weakness, abdominal pain, diarrhea, 

extreme fatigue, malaise, fever, skin changes, homicidal or suicidal ideation, 

or bowel or bladder incontinence.  She does endorse anxiety.  She also endorse 

bilateral numbness and tingling in hands, this is worse at night, associated 

with hand swelling.





- Exam


    -Constitutiona       : Cooperative , not in acute distress .


    -HEENT                :  nech :  supple ,  no Lymphadenopathy  , normal  

thyroid  size .


                               :   eyes  :  no ptosis , no icterus,  no 

photophobia .                                                                   

                                                                                

                                                                                

                                                                                

                                                             


    - neurologic         :   Cranial nerve II   to  XII  intact ,  no   focal 

neurological deffecit  .


    -psychatric          : alert ,  oriented  X 3  ,   appropriate affect   , 

intact judgment  and insight  .  


    -Lymphatic          :    no Lymphadenopathy .


   - musculoskeltal   :     


                                 Cervical Spine 


                                         motor  stregnth in the deltoid and 

biceps,   normal   right side    ,  normal  Left side


                                         motor  stregnth biceps and the wrist 

extensors   normal right side ,normal left side .


                                         motor stregnth in the triceps muscle . 

normal  Right side , normal  Left side  


                                         deep tendon reflexes  normal   at the 

biceps ,   normal  at Brachioradialis , normal  at triceps.


                                         cervical facet loading test= Positive 

Bilateral


                                         Myofascial pain in the cervical para 

spinal muscles


                                   Lumber spine


                                         moter stegnth lower extremities ,thigh 

and legs  5/5 Right side ,  5/5  Left side 


                                         Myofascial pain in the lumbar 

paraspinal muscles





 Lumbar MRI done at [facility] shows multilevel mild degenerative disc 

disease, facet hypertrophy in the lower lumbar spine





Assessment and plan=


   chronic neck pain secondary to cervical herniated disc disease  , cervical 

spondylosis, myofascial pain syndrome  .


   Myofascial pain syndrome cervical trapezius suprascapular and right lumbar 

region


   lumbar radicular pain, lumbar degenerative disc disease, lumbar spondylosis


   chronic and current  use of high-risk medication (opioids)


   Patient denies any side effects of the current pain medication  and the 

current treatment/medication helping the  patient to do activity of daily living

,


   MAPS Reviewed and it was appropriate .  It is reviewed


   Medication managements= patient will be given prescription refills for Norco 

5/325 every 8 hours dispense 90 with 1 refill 


                                 Refill Neurontin to  100 mg 3 times a day with 

1 refill


                                  Continue Mobic 7.5 every daily dispense 30 

with one refill, Zanaflex 4 mg 3 times a day dispense 90 with 1 refill


                                  Fiorinal one tablet by mouth every 12 hours 

dispense 20 with one refill


                                  Urine drug screen was appropriate last visit





I have spent 24 minutes on patient care today.  The time was used to review the 

medical records including relevant urine studies and prescription history, 

review of the available imaging, evaluation and examination of the patient, c

oordination of care with the medical staff and if applicable referring 

physicians, as well as creation of the medical record.





PQRS Measure Charge Sheet


Measure #130: Documentation of Current Meds in Medical Chart: Patient's 

medications documented in chart


Measure #226: Tobacco Use: Screen & Cessation Interventio= patient is to about 

the use of and interventions given


Measure #111: Pneumonia Vaccination: Pneumococcal vaccine NOT administered or 

previously given


Measure #47: Advance Care Plan: Advance care planning discussed & documented, pt

chose/unable to give


Measure #412: Opioid Treatment Agreement: Documented signed opioid trtmnt 

agreemnt min once during opioid trtmnt


Measure #408: Opioid Therapy Follow-up Evaluation: Patient had f/u eval minimum 

every 3 months during opioid therapy


Measure #317: Preventitive Care & Scrn High Bld Press & F/U: Normal blood 

pressure, f/u not required


Measure #128: Body Mass Index (BMI) Screening & Follow-up: BMI documented ABOVE 

normal parameters - f/u documented


Measure #131: Pain Assessment & Follow-up: Pain positive & plan documented, 

Follow-up scheduled


Measure #431: Unhealthy Alcohol Use Preventative Care & Scrn: Patient not 

identified as an unhealthy alcohol u





PQRS Measure Charge Sheet


PQRS Narrative: 


                                        





Smoking Status                   Current every day smoker


Narcotic Agreement Date Signed   07/02/20


Pain Intensity [Neck]            2


Scale Used                       Numeric (1 - 10)


Hx Alcohol Use (MH)              No








Home Medications: 


Ambulatory Orders





Citalopram Hydrobromide [CeleXA] 20 mg PO DAILY 05/02/17 


Multivitamins, Thera [Multivitamin (formulary)] 1 tab PO DAILY 08/22/19 


Levothyroxine Sodium [Synthroid] 150 mcg PO DAILY 12/18/20 


Amitriptyline HCl [Elavil] 10 mg PO HS #30 tab 08/23/21 


Butalbital-ASA-Caffein-Codeine [Fiorinal w/Cod -61-30MG] 1 cap PO Q4H PRN 

30 Days #20 cap 08/23/21 


Gabapentin [Neurontin] 100 mg PO TID 30 Days #90 cap 08/23/21 


HYDROcodone/APAP 5-325MG [Norco 5-325] 1 tab PO BID PRN 30 Days #90 tab 08/23/21




HYDROcodone/APAP 5-325MG [Norco 5-325] 1 tab PO Q8HR PRN 30 Days #90 tab 

08/23/21 


Meloxicam [Mobic] 7.5 mg PO DAILY #30 tab 08/23/21 


tiZANidine [Zanaflex] 4 mg PO Q8HR PRN #90 tab 08/23/21 











Controlled Substance Measures





- Controlled Substance Measures


Is patient prescribed a controlled substance at discharge?: Yes


When asked, does pt state using other controlled substances?: No


If prescribed controlled substance>3 days was MAPS reviewed?: Yes


If Rx opioid, was Start Talking consent form obtained?: Yes


If opioid is for acute pain is fill amount 7 days or less?: No


Was information provided regarding opioid addiction?: No

## 2021-10-07 ENCOUNTER — HOSPITAL ENCOUNTER (OUTPATIENT)
Dept: HOSPITAL 47 - LABWHC1 | Age: 46
Discharge: HOME | End: 2021-10-07
Attending: INTERNAL MEDICINE
Payer: COMMERCIAL

## 2021-10-07 DIAGNOSIS — E03.8: Primary | ICD-10-CM

## 2021-10-07 DIAGNOSIS — E55.9: ICD-10-CM

## 2021-10-07 PROCEDURE — 84443 ASSAY THYROID STIM HORMONE: CPT

## 2021-10-07 PROCEDURE — 36415 COLL VENOUS BLD VENIPUNCTURE: CPT

## 2021-10-07 PROCEDURE — 82306 VITAMIN D 25 HYDROXY: CPT

## 2021-10-18 ENCOUNTER — HOSPITAL ENCOUNTER (OUTPATIENT)
Dept: HOSPITAL 47 - PNWHC3 | Age: 46
End: 2021-10-18
Attending: ANESTHESIOLOGY
Payer: COMMERCIAL

## 2021-10-18 VITALS — HEART RATE: 103 BPM | SYSTOLIC BLOOD PRESSURE: 135 MMHG | RESPIRATION RATE: 18 BRPM | DIASTOLIC BLOOD PRESSURE: 79 MMHG

## 2021-10-18 DIAGNOSIS — F41.9: ICD-10-CM

## 2021-10-18 DIAGNOSIS — G89.29: Primary | ICD-10-CM

## 2021-10-18 DIAGNOSIS — Z79.891: ICD-10-CM

## 2021-10-18 DIAGNOSIS — Z88.2: ICD-10-CM

## 2021-10-18 DIAGNOSIS — M47.816: ICD-10-CM

## 2021-10-18 DIAGNOSIS — F17.200: ICD-10-CM

## 2021-10-18 DIAGNOSIS — M50.20: ICD-10-CM

## 2021-10-18 DIAGNOSIS — M51.36: ICD-10-CM

## 2021-10-18 DIAGNOSIS — M79.18: ICD-10-CM

## 2021-10-18 DIAGNOSIS — M47.812: ICD-10-CM

## 2021-10-18 PROCEDURE — 99211 OFF/OP EST MAY X REQ PHY/QHP: CPT

## 2021-10-18 NOTE — P.PN
Subjective


Progress Note Date: 10/18/21





This is a follow-up visit for this 45 year old female with a chronic history of 

severe neck and low  back pain, she has been diagnosed with cervical spondylosis

and cervical herniated disc disease, she had  posterior cervical decompression, 

cervical myofascial pain syndrome, lumbar radicular pain, she has been managed 

with a combination of medications and interventional pain procedures.  She is 

also taking Neurontin 100 mg 3 times a day,meloxicam and 7.5 mg by mouth daily, 

tizanidine 4 mg 3 times a day, and Fiorinal when necessary for headache.  Norco 

5/325 every 8 hours when necessary , she denies any suicidal ideation. , she 

feels that the numbness and tingling sensation in her hands improved si

gnificantly, she is currently complaining of some back pain issues which is 

localized in the low back area


Review of systems is negative for chest pain, shortness of breath, changes in 

vision, changes in hearing, new onset weakness, abdominal pain, diarrhea, 

extreme fatigue, malaise, fever, skin changes, homicidal or suicidal ideation, 

or bowel or bladder incontinence.  She does endorse anxiety.  She also endorse 

bilateral numbness and tingling in hands, this is worse at night, associated 

with hand swelling.





- Exam


    -Constitutiona       : Cooperative , not in acute distress .


    -HEENT                :  nech :  supple ,  no Lymphadenopathy  , normal  

thyroid  size .


                               :   eyes  :  no ptosis , no icterus,  no 

photophobia .                                                                   

                                                                                

                                                                                

                                                                                

                                                             


    - neurologic         :   Cranial nerve II   to  XII  intact ,  no   focal 

neurological deffecit  .


    -psychatric          : alert ,  oriented  X 3  ,   appropriate affect   , 

intact judgment  and insight  .  


    -Lymphatic          :    no Lymphadenopathy .


   - musculoskeltal   :     


                                 Cervical Spine 


                                         motor  stregnth in the deltoid and 

biceps,   normal   right side    ,  normal  Left side


                                         motor  stregnth biceps and the wrist 

extensors   normal right side ,normal left side .


                                         motor stregnth in the triceps muscle . 

normal  Right side , normal  Left side  


                                         deep tendon reflexes  normal   at the 

biceps ,   normal  at Brachioradialis , normal  at triceps.


                                         cervical facet loading test= Positive 

Bilateral


                                         Myofascial pain in the cervical para 

spinal muscles


                                   Lumber spine


                                         moter stegnth lower extremities ,thigh 

and legs  5/5 Right side ,  5/5  Left side 


                                         Myofascial pain in the lumbar 

paraspinal muscles





 Lumbar MRI done at [facility] shows multilevel mild degenerative disc 

disease, facet hypertrophy in the lower lumbar spine





Assessment and plan=


   chronic neck pain secondary to cervical herniated disc disease  , cervical 

spondylosis, myofascial pain syndrome  .


   lumbar radicular pain, lumbar degenerative disc disease, lumbar spondylosis


   chronic and current  use of high-risk medication (opioids)


   Patient denies any side effects of the current pain medication  and the 

current treatment/medication helping the  patient to do activity of daily living

,


   MAPS Reviewed and it was appropriate .  It is reviewed


   Medication managements= patient will be given prescription refills for Norco 

5/325 every 8 hours dispense 90 with 1 refill 


                                  continue Neurontin 100 mg 3 times a day with 1

refill


                                  Continue Mobic 7.5 every daily dispense 30 

with one refill, Zanaflex 4 mg 3 times a day dispense 90 with 1 refill


                                  fioriol one tablet by mouth every 12 hours 

dispense 20 with one refill


                                  Elavile 10 mg Q HS


Time with Patient: Less than 30





PQRS Measure Charge Sheet


Measure #130: Documentation of Current Meds in Medical Chart: Patient's 

medications documented in chart


Measure #226: Tobacco Use: Screen & Cessation Interventio= patient is to about 

the use of and interventions given


Measure #111: Pneumonia Vaccination: Pneumococcal vaccine NOT administered or 

previously given


Measure #47: Advance Care Plan: Advance care planning discussed & documented, pt

chose/unable to give


Measure #412: Opioid Treatment Agreement: Documented signed opioid trtmnt 

agreemnt min once during opioid trtmnt


Measure #408: Opioid Therapy Follow-up Evaluation: Patient had f/u eval minimum 

every 3 months during opioid therapy


Measure #317: Preventitive Care & Scrn High Bld Press & F/U: Normal blood 

pressure, f/u not required


Measure #128: Body Mass Index (BMI) Screening & Follow-up: BMI documented ABOVE 

normal parameters - f/u documented


Measure #131: Pain Assessment & Follow-up: Pain positive & plan documented, 

Follow-up scheduled


Measure #431: Unhealthy Alcohol Use Preventative Care & Scrn: Patient not 

identified as an unhealthy alcohol u








Objective





- Vital Signs


Vital signs: 


                                   Vital Signs











Temp      


 


Pulse  103 H  10/18/21 08:08


 


Resp  18   10/18/21 08:08


 


BP  135/79   10/18/21 08:08


 


Pulse Ox  100   10/18/21 08:08

## 2021-11-16 ENCOUNTER — HOSPITAL ENCOUNTER (OUTPATIENT)
Dept: HOSPITAL 47 - RADMAMWWP | Age: 46
Discharge: HOME | End: 2021-11-16
Attending: OBSTETRICS & GYNECOLOGY
Payer: COMMERCIAL

## 2021-11-16 DIAGNOSIS — Z80.3: ICD-10-CM

## 2021-11-16 DIAGNOSIS — Z12.31: Primary | ICD-10-CM

## 2021-11-16 PROCEDURE — 77063 BREAST TOMOSYNTHESIS BI: CPT

## 2021-11-16 PROCEDURE — 77067 SCR MAMMO BI INCL CAD: CPT

## 2021-11-17 NOTE — MM
Reason for exam: screening  (asymptomatic).

Last mammogram was performed 1 year and 1 month ago.



History:

Family history of breast cancer in paternal aunt.

Took hormonal contraceptives for 1 year.



Physical Findings:

A clinical breast exam by your physician is recommended on an annual basis and 

results should be correlated with mammographic findings.



MG 3D Screening Mammo W/Cad

Bilateral CC and MLO view(s) were taken.

Prior study comparison: October 8, 2020, bilateral MG 3d screening mammo w/cad.  

September 4, 2019, bilateral MG 3d screening mammo w/cad.

There are scattered fibroglandular densities.  There are benign appearing round 

calcifications bilaterally. There is no discrete abnormality.





ASSESSMENT: Benign, BI-RAD 2



RECOMMENDATION:

Routine screening mammogram of both breasts in 1 year.

## 2021-12-13 ENCOUNTER — HOSPITAL ENCOUNTER (OUTPATIENT)
Dept: HOSPITAL 47 - PNWHC3 | Age: 46
End: 2021-12-13
Attending: ANESTHESIOLOGY
Payer: COMMERCIAL

## 2021-12-13 VITALS
DIASTOLIC BLOOD PRESSURE: 81 MMHG | RESPIRATION RATE: 18 BRPM | TEMPERATURE: 97.9 F | HEART RATE: 91 BPM | SYSTOLIC BLOOD PRESSURE: 128 MMHG

## 2021-12-13 DIAGNOSIS — Z88.2: ICD-10-CM

## 2021-12-13 DIAGNOSIS — F17.200: ICD-10-CM

## 2021-12-13 DIAGNOSIS — M54.2: ICD-10-CM

## 2021-12-13 DIAGNOSIS — M79.18: ICD-10-CM

## 2021-12-13 DIAGNOSIS — R51.9: ICD-10-CM

## 2021-12-13 DIAGNOSIS — G89.29: Primary | ICD-10-CM

## 2021-12-13 PROCEDURE — 99211 OFF/OP EST MAY X REQ PHY/QHP: CPT

## 2021-12-13 PROCEDURE — 80307 DRUG TEST PRSMV CHEM ANLYZR: CPT

## 2021-12-13 NOTE — P.PAINPG
Subjective


Progress Note Date: 12/13/21


Principal diagnosis: 


Neck pain, and lumbar back pain





Ms. Garcia is a 46-year-old pleasant female came to the [facility] pain

clinic for follow-up visit for prescription refill.  Patient has ongoing pain 

for many years.  She is complaining of neck pain, and migraine headaches.  With 

the help of current pain medications she can able to function well, and able to 

work.  Patient describes  pain is aching, throbbing, constant type of pain.  Her

pain is not radiating.   Patient rated  pain levels are 7 out of 10 in severity.

 With the help of medications pain levels are 4-5 out of 10 in severity.  

Activities making  pain worse.  Medications, resting, neck exercises helping in 

relieving patient's pain.  Patient pain some days better than others.  Overall  

activities decreased secondary to pain.  Because of the pain sometimes patient 

is feeling lack of sleep, interest, and energy.  Denied any side effects with 

the medications.  Denied any bowel or bladder problems at this time.   Patient 

denies any suicidal or homicidal ideations intent or plan.  Patient denies any 

auditory or visual hallucinations.  Patient denied any red flag symptoms related

to pain.








Objective





- Vital Signs


Vital signs: 


                                   Vital Signs











Temp  97.9 F   12/13/21 08:41


 


Pulse  91   12/13/21 08:41


 


Resp  18   12/13/21 08:41


 


BP  128/81   12/13/21 08:41


 


Pulse Ox  91 L  12/13/21 08:41














- Exam


General: Well-developed, well-nourished, no acute distress


HEENT: Normocephalic, and atraumatic


Neck: Supple, no neck swelling


Psychiatric: Appropriate mood, and affect


CNS: No focal neurological deficits


Musculoskeletal:


Upper extremity: Normal strength, and range of motion.  Sensation grossly intact


Lower extremity: Normal strength, and range of motion





Cervical spine: 


                    Paravertebral tenderness: Positive


                    Cervical spine facet gilbert: Positive


                    Cervical spine Spurling test: Negative


                    Multiple trigger points positive for cervical area








- Constitutional


Constitutional Comment(s): 


13 point review of Systems , and symptoms negative for chest pain, shortness of 

breath, change in vision, change in weakness, abdominal pain, diarrhea, extreme 

fatigue, malaise, fever, skin changes, suicidal/homicidal ideas, bowel 

incontinence or bladder incontinence.








Assessment and Plan


Assessment: 


Chronic cervicalgia


Chronic headaches


Myofascial pain syndrome





Plan: 


1 Opioid, and psychological risk tools, and scores were reviewed.  Diagnoses, 

prognosis, and multiple treatment options including but not limited to physical 

therapy, interventional therapy, adjunct medication therapy, narcotic 

medication, and surgical options were discussed with the patient.  And all 

questions were answered to the patient's satisfaction.





#2 Opioid agreement: Patient was thoroughly discussed regarding the medication 

side effects, complications associated with narcotic use.  Patient recommended 

do not drive while on narcotic medications, any other sedative medications, and 

illicit drugs including marijuana.  Patient clearly understood.





Patient has signed narcotic agreement and was again asked to re-read this 

document and will be given a copy to take home if requested.  This document 

outlines the policies of the [facility].  It specifically 

counsels the patient to not misuse, overuse, abuse, divert, or sell medications,

and to take them as prescribed by only one healthcare provider and store the 

medications in a safe and preferably locked location.  This document also 

counsels against driving while using narcotic medications and also against using

any alcohol or illicit or recreational drugs in conjunction with opioids. The 

patient verbalized understanding to staff that lack of compliance with any of 

the above will likely result in failure to renew narcotic prescriptions, 

possible discharge from the clinic, and possible legal ramifications thereafter.








#3 Patient was counseled on importance of regular exercise.  Including stephanie chi, 

aerobic exercises as tolerated.  Which helps for chronic pain, and overall well-

being.





Patient also counseled regarding importance of weight control rolling chronic 

pain, and overall other health issues.  By altering diet habits, minimizing 

sugar intake, and processed foods helps in minimizing Inflammation.  Also 

discussed with the patient regarding intermittent fasting.





#4 consultation: Neurologist 





#5 investigations: MAPS , urine drug test- reviewed





#6 interventional procedures: Discussed with the patient regarding trigger point

injections over the cervical area 





#7 medications 


                      #1 Norco 5/325 by mouth every 12 to every 8 hours as 

needed dispense 75 with no refill, plan to decrease as tolerated in future 


                      #2 gabapentin 100 mg by mouth every 12 hours dispense 60 

with no refill plan to decrease as tolerated in future 


                      #3  Mobic 7.5 mg by mouth daily as needed dispense 30 with

5 refills, patient recommended to drink plenty of water to minimize kidney 

insult, and recommended to take after food intake to minimize gastric irritation


                     #4 Zanaflex 2 mg by mouth every 12 hours as needed for 

muscle spasms


                     #5 Fioninal by mouth every 12 hours as needed dispense 20 

pills for severe headaches, plan to decrease gradually in future as tolerated


                     #6 Naloxone 4 mg intranasal for respiratory depression and 

dispense #2, discussed with the patient how to use in by family members if 

needed.   





Medication side effects, complications, long-term consequences discussed with 

the patient.  Patient recommended to contact the pain clinic if noticed any 

issues with given medications.





#8 morphine milligrams equivalents dose ( MME) per day: 12.5 





#9 TENS unit's, and percussion massage device





#10 disposition scheduled to follow up with pain clinic for4 weeks duration .  

Had a lengthy discussion with the patient regarding medications overuse 

headache, and patient agreed to gradually wean her medications as tolerated in 

future clinic visits.














Time with Patient: Less than 30





PQRS Measure Charge Sheet


Measure #130: Documentation of Current Meds in Medical Chart: Patient's 

medications documented in chart


Measure #226: Tobacco Use: Screen & Cessation Intervention: Pt screened for 

tobacco use AND intervention given


Measure #111: Pneumonia Vaccination: Pneumococcal vaccine NOT administered or 

previously given


Measure #47: Advance Care Plan: Advance care planning discussed & documented, pt

chose/unable to give


Measure #412: Opioid Treatment Agreement: Documented signed opioid trtmnt 

agreemnt min once during opioid trtmnt


Measure #408: Opioid Therapy Follow-up Evaluation: Patient had f/u eval minimum 

every 3 months during opioid therapy


Measure #317: Preventitive Care & Scrn High Bld Press & F/U: Normal blood 

pressure, f/u not required


Measure #128: Body Mass Index (BMI) Screening & Follow-up: BMI documented ABOVE 

normal parameters - f/u documented


Measure #131: Pain Assessment & Follow-up: Pain positive & plan documented


Measure #431: Unhealthy Alcohol Use Preventative Care & Scrn: Patient not 

identified as an unhealthy alcohol user


Mode of Arrival: Ambulatory





- Pain Location


  ** Neck


Non-Pharmacological Interventions: Heat, Ice, Position/Reposition, Relaxation 

Technique


Pharmacological Interventions: PRN Medication


PQRS Narrative: 


                                        





Smoking Status                   Current every day smoker


Narcotic Agreement Date Signed   12/13/21


Blood Pressure                   128/81


Pain Intensity [Neck]            4


Scale Used                       Numeric (1 - 10)


Hx Alcohol Use (MH)              No








Home Medications: 


Ambulatory Orders





Citalopram Hydrobromide [CeleXA] 20 mg PO DAILY 05/02/17 


Multivitamins, Thera [Multivitamin (formulary)] 1 tab PO DAILY 08/22/19 


Levothyroxine Sodium [Synthroid] 150 mcg PO DAILY 12/18/20 


Amitriptyline HCl [Elavil] 10 mg PO HS #30 tab 10/18/21 


Butalbital-ASA-Caffein-Codeine [Fiorinal w/Cod -69-30MG] 1 cap PO Q4H PRN 

30 Days #20 cap 10/18/21 


Gabapentin [Neurontin] 100 mg PO TID 30 Days #90 cap 10/18/21 


HYDROcodone/APAP 5-325MG [Norco 5-325] 1 tab PO Q8HR PRN 30 Days #90 tab 

10/18/21 


Meloxicam [Mobic] 7.5 mg PO DAILY #30 tab 10/18/21 


tiZANidine [Zanaflex] 4 mg PO Q8HR PRN #90 tab 10/18/21 











Controlled Substance Measures





- Controlled Substance Measures


Is patient prescribed a controlled substance at discharge?: Yes


When asked, does pt state using other controlled substances?: No


If prescribed controlled substance>3 days was MAPS reviewed?: Yes


If Rx opioid, was Start Talking consent form obtained?: Yes


If opioid is for acute pain is fill amount 7 days or less?: No


Was information provided regarding opioid addiction?: Yes

## 2022-01-19 ENCOUNTER — HOSPITAL ENCOUNTER (OUTPATIENT)
Dept: HOSPITAL 47 - PNWHC3 | Age: 47
End: 2022-01-19
Attending: PHYSICIAN ASSISTANT
Payer: COMMERCIAL

## 2022-01-19 VITALS
HEART RATE: 100 BPM | RESPIRATION RATE: 18 BRPM | SYSTOLIC BLOOD PRESSURE: 118 MMHG | TEMPERATURE: 97.7 F | DIASTOLIC BLOOD PRESSURE: 68 MMHG

## 2022-01-19 DIAGNOSIS — G89.29: ICD-10-CM

## 2022-01-19 DIAGNOSIS — Z88.2: ICD-10-CM

## 2022-01-19 DIAGNOSIS — M47.812: ICD-10-CM

## 2022-01-19 DIAGNOSIS — M50.30: Primary | ICD-10-CM

## 2022-01-19 DIAGNOSIS — Z79.891: ICD-10-CM

## 2022-01-19 DIAGNOSIS — F17.200: ICD-10-CM

## 2022-01-19 PROCEDURE — 99211 OFF/OP EST MAY X REQ PHY/QHP: CPT

## 2022-01-19 NOTE — P.PN
Subjective


Progress Note Date: 01/19/22


Principal diagnosis: 


A  46  yr old female with a history of severe and neck pain secondary to 

cervical degenerative disc diseases spondylosis with facet arthropathy presents 

today for medication refills. Pain level is  currently at 3 /10 in intensity and

bearable. It is accompanied with "tightness" on the right side of the head with 

movements. Pain is dull/ achy throughout the cervical spine and sharp/ shooting 

towards the head. Pain is provoked by looking up , push ups and planking. Pain 

is alleviated with medications, biofreeze and rescue gel, ice and heat, physical

therapy in 2019, use of an inflatable cervical traction device and hot & cold 

showers.





Patient is currently on Norco 5/325mg TID prn, Tizanidine 4mg BID prn, 

Gabapentin 100mg TID, Mobic 7.5mg, Fiorinol #20 prn headaches


Patient denies any side effects of the medication(s), denies excessive 

drowsiness or sleepiness, denies suicidal ideation and reports that the current 

pain medication is  helping to control the  pain and improve activities of daily

living.


Patient denies any motor or sensory deficits. Patient denies any fever or night 

sweats, denies any change in the bowel movements or urination.


 


Physical Examination:


  -Constitutional: Cooperative. Not in acute distress .


  -HEENT:  Neck is supple. No lymphadenopathy. No thyromegaly. Normal  thyroid  

size.


                       Eyes:  No ptosis , no icterus,  no photophobia.  


                       ENT: No auditory deficits. Normal oropharynx. No Thrush. 




 - Respiratory:  Chest clear to auscultations bilaterally. No wheezing. No 

rhonchi.  


 - Cardiovascular:  Regular rate and rhythm.  S1 /  S2  ,   no  S3 ,  no  S4.


 - Gastrointestinal: Abdomen soft  no tenderness. Bowel sounds positive in all 

four quadrants. No organomegaly.


 - Genitourinary:  Deferred.                                                    

                                                                                

                                                                                

                                                                                

                                                                            


 - Neurologic:  Cranial nerve II to  XII  intact. No focal neurological 

deficits.


 - Psychatric: Alert & oriented x 3. Matching mood & appropriate affect. 

Judgment and insight intact.  


 - Lymphatic:  No Lymphadenopathy.


 - Musculoskeletal:     


Cervical spine: Muscle bulk/ tone/ strength in the bilateral upper extremities 

normal.


      Mild tenderness over the bilateral VALORIE


      Spurling test positive


      Facet loading test cervical area positive.   


                                                                                

                                                                                

                                                                                

                                                                                

                                                                                

    


Lumbar spine: Motor bulk/ tone/ strength  lower extremities , thigh and legs : 

5/5 


     Deep tendon reflexes :   Normal  Knee Jerk. Normal Ankle Jerk  .


     Lumbar Facet Loading Test  positive 


     Straight Leg Raise: positive at  30  degree right side/ left side  


     Toni test: positive  right side /  left side


     Range of motion: Range of motion in flexion of the lumbar spine <60 degrees


     Range of motion: Extension of the lumbar spine <20 degrees


     Severe tenderness over the Sacroiliac joint:  right side / left side 





Assessment and plan:


       Chronic neck pain secondary to cervical degenerative disc disease, 

spondylosis with facet arthropathy without myelopathy 


       Chronic and current use of high-risk medication (Opioids).


       The patient was counseled about risk of opioid use, psychological risk 

associated with opioids and was orally counseled to not overuse ,


       divert or sell medications. Pt is to store medication in a safe location.

                     


       The  patient is counseled against driving while using narcotic 

medications and also not to use alcohol or any illicit recreational drugs. 


       Patient verbalized understanding that the lack of compliance will result 

in failure to renew narcotic prescription(s) as well as possible discharge from 

the clinic


       Diagnoses, prognosis and treatment options including but not limited to 

physical therapy, surgical interventions, interventional therapies and 

medication management including narcotics and adjuvant medication were 

discussed.


       All patient questions answered 


       UDS from 12/2021 reviewed and it was consistent


       MAPS reviewed and it was appropriate.


       Prescription refill for Norco 5/325mg #75 with refill, Gabapentin 100mg 

#90 with refill, Tizanidine 4mg #60 with refill, Mobic 7.5mg #30 with refill and

Fiorinol prn headache #20 with refill





I have spent 31 minutes on patient care today. Dr Benoit was available by 

phone for the evaluation of this patient. The time was used to review the 

medical records including relevant urine studies and Prescription history 

(MAPs), review of the available imaging, evaluation and examination of the 

patient, coordination of care with the medical staff and if applicable referring

physicians, as well as creation of the medical record


                                                  








PQRS Measure Charge Sheet


PQRS Narrative: 


                                        





Smoking Status                   Current every day smoker


Narcotic Agreement Date Signed   12/13/21


Pain Intensity [Neck]            4


Scale Used                       Numeric (1 - 10)


Hx Alcohol Use (MH)              No








Home Medications: 


Ambulatory Orders





Citalopram Hydrobromide [CeleXA] 20 mg PO DAILY 05/02/17 


Multivitamins, Thera [Multivitamin (formulary)] 1 tab PO DAILY 08/22/19 


Levothyroxine Sodium [Synthroid] 150 mcg PO DAILY 12/18/20 


Amitriptyline HCl [Elavil] 10 mg PO HS #30 tab 10/18/21 


Butalbital-ASA-Caffein-Codeine [Fiorinal w/Cod -80-30MG] 1 cap PO Q4H PRN 

30 Days #20 cap 10/18/21 


Gabapentin [Neurontin] 100 mg PO TID 30 Days #90 cap 10/18/21 


HYDROcodone/APAP 5-325MG [Norco 5-325] 1 tab PO Q8HR PRN 30 Days #90 tab 

10/18/21 


Meloxicam [Mobic] 7.5 mg PO DAILY #30 tab 10/18/21 


tiZANidine [Zanaflex] 4 mg PO Q8HR PRN #90 tab 10/18/21

## 2022-03-16 ENCOUNTER — HOSPITAL ENCOUNTER (OUTPATIENT)
Dept: HOSPITAL 47 - PNWHC3 | Age: 47
End: 2022-03-16
Attending: PHYSICIAN ASSISTANT
Payer: COMMERCIAL

## 2022-03-16 VITALS
SYSTOLIC BLOOD PRESSURE: 125 MMHG | HEART RATE: 79 BPM | RESPIRATION RATE: 18 BRPM | DIASTOLIC BLOOD PRESSURE: 74 MMHG | TEMPERATURE: 98.4 F

## 2022-03-16 DIAGNOSIS — Z79.891: ICD-10-CM

## 2022-03-16 DIAGNOSIS — Z88.2: ICD-10-CM

## 2022-03-16 DIAGNOSIS — M47.816: ICD-10-CM

## 2022-03-16 DIAGNOSIS — M51.36: Primary | ICD-10-CM

## 2022-03-16 DIAGNOSIS — F17.200: ICD-10-CM

## 2022-03-16 DIAGNOSIS — M47.812: ICD-10-CM

## 2022-03-16 DIAGNOSIS — G89.29: ICD-10-CM

## 2022-03-16 DIAGNOSIS — M50.30: ICD-10-CM

## 2022-03-16 PROCEDURE — 99211 OFF/OP EST MAY X REQ PHY/QHP: CPT

## 2022-03-16 NOTE — P.PN
Subjective


Progress Note Date: 03/16/22


Principal diagnosis: 





A  46  yr old female with a history of severe and chronic neck pain secondary to

cervical degenerative disc diseases and spondylosis with facet arthropathy 

presents today for medication refills.  Patient states pain is in the cervical 

spine, 2 out of 10 in intensity, dull, achy, constant in the middle aspect of 

the neck with radiation of pain up the head and down to the shoulders 

bilaterally. Pain is provoked by extension of the head. Pain is alleviated with 

medications, injections in the past, ice or heat, physical therapy in the past, 

chiropractic treatment for the past, home stretching regimen, massage therapy 

currently when patient can afford it, repositioning and rest. Pt feels her 

headaches are reduced in frequency and would like to reduce the quantity of 

Fiorinol per refill.





Interventional pain procedures completed include L[facility]I 2/19/2020


Patient is currently on Norco 5/325mg TID prn. Neurontin 100mg TID. Zanaflex 4mg

TID prn. Fiorinol #20 per 30 days.


Patient denies any side effects of the medication(s), denies excessive 

drowsiness or sleepiness, denies suicidal ideation and reports that the current 

pain medication is  helping to control the  pain and improve activities of daily

living.


Patient denies any motor or sensory deficits. Patient denies any fever or night 

sweats, denies any change in the bowel movements or urination.


 


Physical Examination:


  -Constitutional: Cooperative. Not in acute distress .


  -HEENT:  Neck is supple. No lymphadenopathy. No thyromegaly. Normal  thyroid  

size.


                       Eyes:  No ptosis , no icterus,  no photophobia.  


                       ENT: No auditory deficits. Normal oropharynx. No Thrush. 




 - Respiratory:  Chest clear to auscultations bilaterally. No wheezing. No 

rhonchi.  


 - Cardiovascular:  Regular rate and rhythm.  S1 /  S2  ,   no  S3 ,  no  S4.


 - Gastrointestinal: Abdomen soft  no tenderness. Bowel sounds positive in all 

four quadrants. No organomegaly.


 - Genitourinary:  Deferred.                                                    

                                                                                

                                                                                

                                                                                

                                                                            


 - Neurologic:  Cranial nerve II to  XII  intact. No focal neurological 

deficits.


 - Psychatric: Alert & oriented x 3. Matching mood & appropriate affect. 

Judgment and insight intact.  


 - Lymphatic:  No Lymphadenopathy.


 - Musculoskeletal:     


Cervical spine: 


      Muscle bulk/ tone/ strength in the bilateral upper extremities normal.


      Vertebral body tenderness over the C4, C5, C6


      Facet loading test cervical area positive.   


                                                                                

                                                                                

                                                                                

                                                                                

                                                                                

    


Lumbar spine: 


     Motor bulk/ tone/ strength  lower extremities , thigh and legs : 5/5 


     Deep tendon reflexes :   Normal  Knee Jerk. Normal Ankle Jerk  .


     Vertebral body tenderness to palpation over 


     Lumbar Facet Loading Test  positive 


     Straight Leg Raise: positive at  30  degrees right side/ left side 


     Gaenslen's Test positive  





Sacral spine :


     Severe tenderness over the Sacroiliac joint:  right side / left side 


     Range of motion: Flexion of the lumbar spine <60 degrees


     Range of motion: Extension of the lumbar spine <20 degrees


     Gaenslen's Test positive


     Toni test: positive  right side /  left side





Assessment and plan:


       Chronic low back pain secondary to lumbar degenerative disc disease , 

lumbar spondylosis with facet arthropathy without myelopathy 


       Chronic and current use of high-risk medication (Opioids).


       The patient was counseled about risk of opioid use, psychological risk 

associated with opioids and was orally counseled to not overuse ,


       divert or sell medications. Pt is to store medication in a safe location.

                     


       The  patient is counseled against driving while using narcotic m

edications and also not to use alcohol or any illicit recreational drugs. 


       Patient verbalized understanding that the lack of compliance will result 

in failure to renew narcotic prescription(s) as well as possible discharge from 

the clinic


       Diagnoses, prognosis and treatment options including but not limited to 

physical therapy, surgical interventions, interventional therapies and medi

cation management including narcotics and adjuvant medication were discussed.


       All patient questions answered 


       MAPS reviewed and it was appropriate.


       Prescription refill for Fiorinol Q12h prn #10 with 1 refill. Norco 

5/325mg #90 with refill. Neurontin 100mg #90 with refill. Zanaflex TID prn #90 

with refill.





I have spent 31 minutes on patient care today. Dr Benoit was available by 

phone for the evaluation of this patient. The time was used to review the 

medical records including relevant urine studies and Prescription history 

(MAPs), review of the available imaging, evaluation and examination of the 

patient, coordination of care with the medical staff and if applicable referring

physicians, as well as creation of the medical record


                                                  





Objective





- Vital Signs


Vital signs: 


                                   Vital Signs











Temp  98.4 F   03/16/22 12:40


 


Pulse  79   03/16/22 12:40


 


Resp  18   03/16/22 12:40


 


BP  125/74   03/16/22 12:40


 


Pulse Ox  96   03/16/22 12:40














PQRS Measure Charge Sheet


Mode of Arrival: Ambulatory





- Pain Location


  ** Neck


Non-Pharmacological Interventions: Chiropractic Treatment, Exercise, Heat, Home 

Exercise, Ice, Physical Therapy, Position/Reposition, Stretching


Pharmacological Interventions: Epidural, Medication, PRN Medication


PQRS Narrative: 


                                        





Smoking Status                   Current every day smoker


Narcotic Agreement Date Signed   12/13/21


Blood Pressure                   125/74


Pain Intensity [Neck]            2


Scale Used                       Numeric (1 - 10)


Hx Alcohol Use (MH)              No








Home Medications: 


Ambulatory Orders





Citalopram Hydrobromide [CeleXA] 20 mg PO DAILY 05/02/17 


Multivitamins, Thera [Multivitamin (formulary)] 1 tab PO DAILY 08/22/19 


Levothyroxine Sodium [Synthroid] 150 mcg PO DAILY 12/18/20 


Amitriptyline HCl [Elavil] 10 mg PO HS #30 tab 10/18/21 


Meloxicam [Mobic] 7.5 mg PO DAILY #30 tab 10/18/21 


Butalbital-ASA-Caffein-Codeine [Fiorinal w/Cod -80-30MG] 1 cap PO Q12H PRN

30 Days #10 cap 03/16/22 


Gabapentin [Neurontin] 100 mg PO TID 30 Days #90 cap 03/16/22 


HYDROcodone/APAP 5-325MG [Norco 5-325] 1 tab PO Q8HR PRN 30 Days #90 tab 03/16 /22 


HYDROcodone/APAP 5-325MG [Norco 5-325] 1 tab PO Q8HR PRN 30 Days #90 tab 

03/16/22 


tiZANidine [Zanaflex] 4 mg PO Q8HR PRN 30 Days #90 tab 03/16/22

## 2022-05-12 ENCOUNTER — HOSPITAL ENCOUNTER (OUTPATIENT)
Dept: HOSPITAL 47 - LABWHC1 | Age: 47
Discharge: HOME | End: 2022-05-12
Attending: INTERNAL MEDICINE
Payer: COMMERCIAL

## 2022-05-12 DIAGNOSIS — E03.8: Primary | ICD-10-CM

## 2022-05-12 PROCEDURE — 84443 ASSAY THYROID STIM HORMONE: CPT

## 2022-05-12 PROCEDURE — 36415 COLL VENOUS BLD VENIPUNCTURE: CPT

## 2022-07-14 ENCOUNTER — HOSPITAL ENCOUNTER (OUTPATIENT)
Dept: HOSPITAL 47 - PNWHC3 | Age: 47
End: 2022-07-14
Attending: ANESTHESIOLOGY
Payer: COMMERCIAL

## 2022-07-14 VITALS
SYSTOLIC BLOOD PRESSURE: 120 MMHG | RESPIRATION RATE: 16 BRPM | TEMPERATURE: 98.9 F | DIASTOLIC BLOOD PRESSURE: 79 MMHG | HEART RATE: 79 BPM

## 2022-07-14 DIAGNOSIS — M51.36: ICD-10-CM

## 2022-07-14 DIAGNOSIS — F17.200: ICD-10-CM

## 2022-07-14 DIAGNOSIS — G89.29: ICD-10-CM

## 2022-07-14 DIAGNOSIS — Z51.81: ICD-10-CM

## 2022-07-14 DIAGNOSIS — Z88.2: ICD-10-CM

## 2022-07-14 DIAGNOSIS — M47.816: Primary | ICD-10-CM

## 2022-07-14 DIAGNOSIS — Z79.891: ICD-10-CM

## 2022-07-14 PROCEDURE — 99212 OFFICE O/P EST SF 10 MIN: CPT

## 2022-07-14 NOTE — P.PN
Subjective


Progress Note Date: 12/21/20





This is a follow-up visit for this 45 year old female with a chronic history of 

severe neck and back pain, she has been diagnosed with cervical spondylosis and 

cervical herniated disc disease, Status post  posterior cervical decompression, 

, cervical myofascial pain syndrome, lumbar radicular pain, she has been managed

with a combination of medications and interventional pain procedures.  She is 

also taking Neurontin 400 mg 3 times a day,meloxicam and 0.5 mg by mouth daily, 

tizanidine 4 mg 3 times a day, and Fiorinal when necessary for headache. she 

denies any side effect of the medication.   she denies any excessive drowsiness 

or sleepiness, she denies any suicidal ideation. 


Description been diagnosed with the carpal tunnel syndrome bilaterally and she 

has bilateral carpal tunnel release done recently, she feels that the numbness 

and tingling sensation in her hands improved significantly, she is currently 

complaining of some back pain issues which is localized in the low back area


Review of systems is negative for chest pain, shortness of breath, changes in 

vision, changes in hearing, new onset weakness, abdominal pain, diarrhea, 

extreme fatigue, malaise, fever, skin changes, homicidal or suicidal ideation, 

or bowel or bladder incontinence.  She does endorse anxiety.  She also endorse 

bilateral numbness and tingling in hands, this is worse at night, associated 

with hand swelling.








Objective





- Exam








  


    -Constitutiona       : Cooperative , not in acute distress .


    -HEENT                :  nech :  supple ,  no Lymphadenopathy  , normal  

thyroid  size .


                               :   eyes  :  no ptosis , no icterus,  no 

photophobia .                                                                   

                                                                                

                                                                                

                                                                                

                                                             


    - neurologic         :   Cranial nerve II   to  XII  intact ,  no   focal 

neurological deffecit  .


    -psychatric          : alert ,  oriented  X 3  ,   appropriate affect   , 

intact judgment  and insight  .  


    -Lymphatic          :    no Lymphadenopathy .


   - musculoskeltal   :     


                                 Cervical Spine 


                                         motor  stregnth in the deltoid and 

biceps,   normal   right side    ,  normal  Left side


                                         motor  stregnth biceps and the wrist 

extensors   normal right side ,normal left side .


                                         motor stregnth in the triceps muscle . 

normal  Right side , normal  Left side  


                                         deep tendon reflexes  normal   at the 

biceps ,   normal  at Brachioradialis , normal  at triceps.


                                         cervical facet loading test= Positive 

Bilateral


                                         Myofascial pain in the cervical para 

spinal muscles


                                   Lumber spine


                                         moter stegnth lower extremities ,thigh 

and legs  5/5 Right side ,  5/5  Left side 


                                         Myofascial pain in the lumbar 

paraspinal muscles





 Lumbar MRI done at [facility] shows multilevel mild degenerative disc 

disease, facet hypertrophy in the lower lumbar spine.








Assessment and Plan


Plan: 








Assessment and plan=


   chronic neck pain secondary to cervical herniated disc disease  , cervical 

spondylosis, myofascial pain syndrome  .


   Myofascial pain syndrome cervical trapezius suprascapular and right lumbar 

region


   lumbar radicular pain, lumbar degenerative disc disease, lumbar spondylosis


   chronic and current  use of high-risk medication (opioids)


   Patient denies any side effects of the current pain medication  and the 

current treatment/medication helping the  patient to do activity of daily living

,


   MAPS Reviewed and it was appropriate .  It is reviewed


   Medication managements= patient will be given prescription refills for Norco 

5/325 every 8 hours dispense 90 with 1 refill 


                                  Decrease Neurontin to  ,Neurontin 300 mg every

8 hours dispense 90 with 1 refill


                                  Continue Mobic 7.5 every daily dispense 30 

with one refill, Zanaflex 4 mg 3 times a day dispense 90 with 1 refill


Time with Patient: Less than 30





PQRS Measure Charge Sheet


Measure #130: Documentation of Current Meds in Medical Chart: Patient's 

medications documented in chart


Measure #226: Tobacco Use: Screen & Cessation Intervention: Pt not a tobacco 

user


Measure #111: Pneumonia Vaccination: Pneumococcal vaccine NOT administered or 

previously given


Measure #47: Advance Care Plan: Advance care planning discussed & documented, pt

chose/unable to give


Measure #412: Opioid Treatment Agreement: Documented signed opioid trtmnt 

agreemnt min once during opioid trtmnt


Measure #408: Opioid Therapy Follow-up Evaluation: Patient had f/u eval minimum 

every 3 months during opioid therapy


Measure #317: Preventitive Care & Scrn High Bld Press & F/U: Normal blood 

pressure, f/u not required


Measure #128: Body Mass Index (BMI) Screening & Follow-up: BMI documented ABOVE 

normal parameters - f/u documented


Measure #131: Pain Assessment & Follow-up: Pain positive & plan documented, 

Follow-up scheduled


Measure #431: Unhealthy Alcohol Use Preventative Care & Scrn: Patient not 

identified as an unhealthy alcohol user


PQRS Narrative: 


Time with Patient: Less than 30
Detail Level: Zone

## 2022-07-14 NOTE — P.PAINPG
PQRS Measure Charge Sheet


Comment: 





A  46  yr old female with a history of severe and chronic neck pain secondary to

 degenerative disc diseases and lumbar spondylosis with facet arthropathy 

presents today for  medication refills. Pain level is currently at 2/10 in 

intensity but escalates as high as 8/10 when hyperextending spine.  Pain is sachin

p/ shooting towards the BL shouilders. Pain is alleviated with  medications, 

topicals, ice, heat, physical therapy in the past, chiropractic treatment as 

needed, daily home stretching regimen, massage therapy as needed, repositioning 

and rest.





Patient is currently on Norco 5/325mg #90, Neurontin 100mg #90, Zanaflex, 

Fiorinol #10


Patient denies any side effects of the medication(s), denies excessive 

drowsiness or sleepiness, denies suicidal ideation and reports that the current 

pain medication is  helping to control the  pain and improve activities of daily

living.


Patient denies any motor or sensory deficits. Patient denies any fever or night 

sweats, denies any change in the bowel movements or urination.


 


Physical Examination:


  -Constitutional: Cooperative. Not in acute distress .                         

                                                                                

                                                                                

  


 - Neurologic:  Cranial nerve II to  XII  intact. No focal neurological 

deficits.


 - Psychatric: Alert & oriented x 3. Matching mood & appropriate affect. 

Judgment and insight intact.  


 - Musculoskeletal:     


Cervical spine: 


      Muscle bulk/ tone/ strength in the bilateral upper extremities normal


      Vertebral body tenderness to palpation over C6, C7


      Spurling test positive


      Distraction test positive


      Facet loading test positive





Thoracic spine   


     Muscle bulk / tone/ strength in the bilateral paraspinal muscles normal


     Vertebral body tender to palpation over


     Facet loading test positive


                                                                                

                                                                                

                                                                                

                                                                                

                                                                                

    


Lumbar spine: 


     Motor bulk/ tone/ strength  lower extremities , thigh and legs : 5/5 


     Deep tendon reflexes :   Normal  Knee Jerk. Normal Ankle Jerk  .


     Vertebral body tenderness to palpation over 


     Lumbar Facet Loading Test  positive 


     Straight Leg Raise: positive at  30  degrees right side/ left side 


     Gaenslen's Test positive  





Sacral spine :


     Severe tenderness over the Sacroiliac joint:  right side / left side 


     Range of motion: Flexion of the lumbar spine <60 degrees


     Range of motion: Extension of the lumbar spine <20 degrees


     Gaenslen's Test positive 


     Juan Carlos's Test positive 


     Toni test: positive  right side /  left side


     Thigh Thrust Test


     Sacral Thrust Test





Assessment and plan:


       Chronic neck pain secondary to  degenerative disc disease ,  spondylosis 

with facet arthropathy without myelopathy


       Chronic and current use of high-risk medication (Opioids).


       The patient was counseled about risk of opioid use, psychological risk 

associated with opioids and was orally counseled to not overuse ,


       divert or sell medications. Pt is to store medication in a safe location.

                     


       The  patient is counseled against driving while using narcotic 

medications and also not to use alcohol or any illicit recreational drugs. 


       Patient verbalized understanding that the lack of compliance will result 

in failure to renew narcotic prescription(s) as well as possible discharge from 

the clinic


       Diagnoses, prognosis and treatment options including but not limited to 

physical therapy, surgical interventions, interventional therapies and 

medication management including narcotics and adjuvant medication were 

discussed.


       All patient questions answered 


       MAPS reviewed and it was appropriate.


       UDS collected today 7/14/22


       Prescription refill for Norco 5/325mg #90, Neurontin 100mg #90, Zanaflex,

Fiorinol #10 w 1 refill





I have spent less than 30 minutes on patient care today. Dr Benoit was 

available by phone for the evaluation of this patient. The time was used to 

review the medical records including relevant urine studies and Prescription 

history (MAPs), review of the available imaging, evaluation and examination of 

the patient, coordination of care with the medical staff and if applicable 

referring physicians, as well as creation of the medical record


                                                  


PQRS Narrative: 


                                        





Smoking Status                   Current every day smoker


Narcotic Agreement Date Signed   12/13/21


Hx Alcohol Use (MH)              No








Home Medications: 


Ambulatory Orders





Citalopram Hydrobromide [CeleXA] 20 mg PO DAILY 05/02/17 


Multivitamins, Thera [Multivitamin (formulary)] 1 tab PO DAILY 08/22/19 


Levothyroxine Sodium [Synthroid] 150 mcg PO DAILY 12/18/20 


Amitriptyline HCl [Elavil] 10 mg PO HS #30 tab 10/18/21 


Meloxicam [Mobic] 7.5 mg PO DAILY 30 Days #30 tab 03/16/22 


Butalbital-ASA-Caffein-Codeine [Fiorinal w/Cod -19-30MG] 1 cap PO Q12H PRN

30 Days #10 cap 05/19/22 


Gabapentin [Neurontin] 100 mg PO TID 30 Days #90 cap 05/19/22 


HYDROcodone/APAP 5-325MG [Norco 5-325] 1 tab PO Q8HR PRN 30 Days #90 tab 

05/19/22 


HYDROcodone/APAP 5-325MG [Norco 5-325] 1 tab PO Q8HR PRN 30 Days #90 tab 

05/19/22 


tiZANidine [Zanaflex] 4 mg PO Q8HR PRN 30 Days #90 tab 05/19/22 











Controlled Substance Measures





- Controlled Substance Measures


Is patient prescribed a controlled substance at discharge?: Yes


When asked, does pt state using other controlled substances?: No


If prescribed controlled substance>3 days was MAPS reviewed?: Yes


If Rx opioid, was Start Talking consent form obtained?: Yes


Was information provided regarding opioid addiction?: Yes

## 2022-08-29 ENCOUNTER — HOSPITAL ENCOUNTER (OUTPATIENT)
Dept: HOSPITAL 47 - LABWHC1 | Age: 47
Discharge: HOME | End: 2022-08-29
Attending: INTERNAL MEDICINE
Payer: COMMERCIAL

## 2022-08-29 ENCOUNTER — HOSPITAL ENCOUNTER (OUTPATIENT)
Dept: HOSPITAL 47 - PNWHC3 | Age: 47
End: 2022-08-29
Attending: ANESTHESIOLOGY
Payer: COMMERCIAL

## 2022-08-29 VITALS
HEART RATE: 81 BPM | TEMPERATURE: 98 F | DIASTOLIC BLOOD PRESSURE: 68 MMHG | RESPIRATION RATE: 18 BRPM | SYSTOLIC BLOOD PRESSURE: 105 MMHG

## 2022-08-29 DIAGNOSIS — G89.29: ICD-10-CM

## 2022-08-29 DIAGNOSIS — Z79.891: ICD-10-CM

## 2022-08-29 DIAGNOSIS — M50.30: Primary | ICD-10-CM

## 2022-08-29 DIAGNOSIS — E55.9: ICD-10-CM

## 2022-08-29 DIAGNOSIS — Z88.2: ICD-10-CM

## 2022-08-29 DIAGNOSIS — F17.200: ICD-10-CM

## 2022-08-29 DIAGNOSIS — M47.812: ICD-10-CM

## 2022-08-29 DIAGNOSIS — E03.8: Primary | ICD-10-CM

## 2022-08-29 PROCEDURE — 99211 OFF/OP EST MAY X REQ PHY/QHP: CPT

## 2022-08-29 PROCEDURE — 82306 VITAMIN D 25 HYDROXY: CPT

## 2022-08-29 PROCEDURE — 84443 ASSAY THYROID STIM HORMONE: CPT

## 2022-08-29 PROCEDURE — 36415 COLL VENOUS BLD VENIPUNCTURE: CPT

## 2022-08-29 NOTE — P.PAINPG
Objective





- Vital Signs


Vital signs: 


                                 Intake & Output











 08/28/22 08/29/22 08/29/22





 18:59 06:59 18:59


 


Weight   97.069 kg














PQRS Measure Charge Sheet


Comment: 





A  46  yr old female with a history of severe and chronic neck pain secondary to

cervical degenerative disc diseases and spondylosis with facet arthropathy 

presents today for medication refills. Pain level is currently at  3/10 in 

intensity but 8/10, constant,  dull/ achy/ pulsating pain w shooting towards . 

Pain is provoked by hyperextension. Pain is alleviated with  PT x 8 wks in 2019,

medications, massage therapy as needed, chiropractic treatments in 2019, heat, 

ice, topicals, repositioning and rest.





Interventional pain procedures completed include JAZZMINE R paramedial L4-L5, JAZZMINE C7-

T1


Patient is currently on Norco, Fiorinol, Zanaflex, Nuerontin


Patient denies any side effects of the medication(s), denies excessive 

drowsiness or sleepiness, denies suicidal ideation and reports that the current 

pain medication is  helping to control the  pain and improve activities of daily

living.


Patient denies any motor or sensory deficits. Patient denies any fever or night 

sweats, denies any change in the bowel movements or urination.


 


Physical Examination:


  -Constitutional: Cooperative. Not in acute distress .                         

                                                                                

                                                                                

  


 - Neurologic:  Cranial nerve II to  XII  intact. No focal neurological 

deficits.


 - Psychatric: Alert & oriented x 3. Matching mood & appropriate affect. 

Judgment and insight intact.  


 - Musculoskeletal:     


Cervical spine: 


      Muscle bulk/ tone/ strength in the bilateral upper extremities normal


      Vertebral body tenderness to palpation over 


      Spurling test positive


      Distraction test positive


      Facet loading test positive





Thoracic spine   


     Muscle bulk / tone/ strength in the bilateral paraspinal muscles normal


     Vertebral body tender to palpation over


     Facet loading test positive


                                                                                

                                                                                

                                                                                

                                                                                

                                                                                

    


Lumbar spine: 


     Motor bulk/ tone/ strength  lower extremities , thigh and legs : 5/5 


     Deep tendon reflexes :   Normal  Knee Jerk. Normal Ankle Jerk  .


     Vertebral body tenderness to palpation over 


     Lumbar Facet Loading Test  positive 


     Straight Leg Raise: positive at  30  degrees right side/ left side 


     Gaenslen's Test positive  





Sacral spine :


     Severe tenderness over the Sacroiliac joint:  right side / left side 


     Range of motion: Flexion of the lumbar spine <60 degrees


     Range of motion: Extension of the lumbar spine <20 degrees


     Gaenslen's Test positive 


     Juan Carlos's Test positive 


     Toni test: positive  right side /  left side


     Thigh Thrust Test


     Sacral Thrust Test





Assessment and plan:


       Chronic neck pain secondary to cervical degenerative disc disease , 

spondylosis with facet arthropathy without myelopathy


       Chronic and current use of high-risk medication (Opioids).


       The patient was counseled about risk of opioid use, psychological risk 

associated with opioids and was orally counseled to not overuse ,


       divert or sell medications. Pt is to store medication in a safe location.

                     


       The  patient is counseled against driving while using narcotic 

medications and also not to use alcohol or any illicit recreational drugs. 


       Patient verbalized understanding that the lack of compliance will result 

in failure to renew narcotic prescription(s) as well as possible discharge from 

the clinic


       Diagnoses, prognosis and treatment options including but not limited to 

physical therapy, surgical interventions, interventional therapies and 

medication management including narcotics and adjuvant medication were 

discussed.


       All patient questions answered 


       MAPS reviewed and it was appropriate.


       Awaiting UDS results from 7/14/22


       Prescription refill for Norco 5/325mg #90, Fiorinol #10, Neurontin 100mg 

#90, Zanaflex 4mg #90 w 1 refill





I have spent less than 30 minutes on patient care today. Dr Benoit was 

available by phone for the evaluation of this patient. The time was used to 

review the medical records including relevant urine studies and Prescription 

history (MAPs), review of the available imaging, evaluation and examination of 

the patient, coordination of care with the medical staff and if applicable 

referring physicians, as well as creation of the medical record


                                                  





- Pain Location


  ** Bilateral Lower Neck


Non-Pharmacological Interventions: Chiropractic Treatment, Heat, Ice, 

Inactivity, Massage, Physical Therapy


Pharmacological Interventions: Scheduled Medication, Topical Medication


PQRS Narrative: 


                                        





Smoking Status                   Current every day smoker


Narcotic Agreement Date Signed   12/13/21


Hx Alcohol Use (MH)              No








Home Medications: 


Ambulatory Orders





Citalopram Hydrobromide [CeleXA] 20 mg PO DAILY 05/02/17 


Multivitamins, Thera [Multivitamin (formulary)] 1 tab PO DAILY 08/22/19 


Levothyroxine Sodium [Synthroid] 150 mcg PO DAILY 12/18/20 


Amitriptyline HCl [Elavil] 10 mg PO HS #30 tab 10/18/21 


Meloxicam [Mobic] 7.5 mg PO DAILY 30 Days #30 tab 03/16/22 


HYDROcodone/APAP 5-325MG [Norco 5-325] 1 tab PO Q8HR PRN 30 Days #90 tab 

07/18/22 


Butalbital-ASA-Caffein-Codeine [Fiorinal w/Cod -68-30MG] 1 cap PO Q12H PRN

30 Days #10 cap 08/29/22 


Gabapentin [Neurontin] 100 mg PO TID 30 Days #90 cap 08/29/22 


HYDROcodone/APAP 5-325MG [Norco 5-325] 1 tab PO Q8HR PRN 30 Days #90 tab 

08/29/22 


HYDROcodone/APAP 5-325MG [Norco 5-325] 1 tab PO Q8HR PRN 30 Days #90 tab 

08/29/22 


tiZANidine [Zanaflex] 4 mg PO Q8HR PRN 30 Days #90 tab 08/29/22 











Controlled Substance Measures





- Controlled Substance Measures


Is patient prescribed a controlled substance at discharge?: Yes


When asked, does pt state using other controlled substances?: No


If prescribed controlled substance>3 days was MAPS reviewed?: Yes


If Rx opioid, was Start Talking consent form obtained?: Yes


Was information provided regarding opioid addiction?: Yes

## 2022-10-26 ENCOUNTER — HOSPITAL ENCOUNTER (OUTPATIENT)
Dept: HOSPITAL 47 - PNWHC3 | Age: 47
End: 2022-10-26
Attending: ANESTHESIOLOGY
Payer: COMMERCIAL

## 2022-10-26 VITALS — SYSTOLIC BLOOD PRESSURE: 116 MMHG | DIASTOLIC BLOOD PRESSURE: 70 MMHG | HEART RATE: 85 BPM | RESPIRATION RATE: 18 BRPM

## 2022-10-26 DIAGNOSIS — Z79.01: ICD-10-CM

## 2022-10-26 DIAGNOSIS — F17.200: ICD-10-CM

## 2022-10-26 DIAGNOSIS — G89.29: ICD-10-CM

## 2022-10-26 DIAGNOSIS — M50.30: ICD-10-CM

## 2022-10-26 DIAGNOSIS — Z88.2: ICD-10-CM

## 2022-10-26 DIAGNOSIS — M47.812: Primary | ICD-10-CM

## 2022-10-26 PROCEDURE — 99212 OFFICE O/P EST SF 10 MIN: CPT

## 2022-10-26 PROCEDURE — 80307 DRUG TEST PRSMV CHEM ANLYZR: CPT

## 2022-10-26 NOTE — P.PAINPG
PQRS Measure Charge Sheet


Comment: 





A  47  yr old female with a history of severe and chronic neck pain secondary to

cervical degenerative disc diseases and lumbar spondylosis with facet 

arthropathy without myelopathy presents today for . Pain level is currently at  

2/10 in intensity, constant,  dull/ achy in character w shooting towards . Pain 

is provoked by hyperextension. Pain is alleviated with PT in the past, home 

exercise regimen, massage as needed, home exercise as needed, heat, ice, meds 

(Norco, Fiorinol, Neurontin, Zanaflex), topicals, repositioning and rest. UDS 

from last visit unavailable. Will collect UDS today and reduce Norco 5/325mg 

from #90 to #60 and Zanaflex from #90 to #60.





Interventional pain procedures completed include ULICES (2019)


Patient is currently on Norco, Fiorinol, Zanaflex


Patient denies any side effects of the medication(s), denies excessive 

drowsiness or sleepiness, denies suicidal ideation and reports that the current 

pain medication is  helping to control the  pain and improve activities of daily

living.


Patient denies any motor or sensory deficits. Patient denies any fever or night 

sweats, denies any change in the bowel movements or urination.


 


Physical Examination:


  -Constitutional: Cooperative. Not in acute distress .                         

                                                                                

                                                                                

  


 - Neurologic:  Cranial nerve II to  XII  intact. No focal neurological deficit

s.


 - Psychatric: Alert & oriented x 3. Matching mood & appropriate affect. 

Judgment and insight intact.  


 - Musculoskeletal:     


Cervical spine: 


      Muscle bulk/ tone/ strength in the bilateral upper extremities normal


      Vertebral body tenderness to palpation over C2, C3, C4


      Spurling test positive


      Distraction test positive


      Facet loading test positive





Thoracic spine   


     Muscle bulk / tone/ strength in the bilateral paraspinal muscles normal


     Vertebral body tender to palpation over


     Facet loading test positive


                                                                                

                                                                                

                                                                                

                                                                                

                                                                                

    


Lumbar spine: 


     Motor bulk/ tone/ strength  lower extremities , thigh and legs : 5/5 


     Deep tendon reflexes :   Normal  Knee Jerk. Normal Ankle Jerk  .


     Vertebral body tenderness to palpation over 


     Lumbar Facet Loading Test  positive 


     Straight Leg Raise: positive at  30  degrees right side/ left side 


     Gaenslen's Test positive  





Sacral spine :


     Severe tenderness over the Sacroiliac joint:  right side / left side 


     Range of motion: Flexion of the lumbar spine <60 degrees


     Range of motion: Extension of the lumbar spine <20 degrees


     Gaenslen's Test positive 


     Juan Carlos's Test positive 


     Toni test: positive  right side /  left side


     Thigh Thrust Test


     Sacral Thrust Test





Assessment and plan:


       Chronic neck pain secondary to cervical degenerative disc disease, 

spondylosis with facet arthropathy without myelopathy


       Chronic and current use of high-risk medication (Opioids).


       The patient was counseled about risk of opioid use, psychological risk 

associated with opioids and was orally counseled to not overuse ,


       divert or sell medications. Pt is to store medication in a safe location.

                     


       The  patient is counseled against driving while using narcotic 

medications and also not to use alcohol or any illicit recreational drugs. 


       Patient verbalized understanding that the lack of compliance will result 

in failure to renew narcotic prescription(s) as well as possible discharge from 

the clinic


       Diagnoses, prognosis and treatment options including but not limited to 

physical therapy, surgical interventions, interventional therapies and 

medication management including narcotics and adjuvant medication were 

discussed.


       All patient questions answered 


       MAPS reviewed and it was appropriate.


       UDS collected today 10/26/22


       Prescription refill for Norco 5/325mg #60, Neurontin 100mg #90, Fiorinol 

#10, Zanaflex #60 w 1 RF





I have spent less than 30 minutes on patient care today. Dr Benoit was 

available by phone for the evaluation of this patient. The time was used to 

review the medical records including relevant urine studies and Prescription 

history (MAPs), review of the available imaging, evaluation and examination of 

the patient, coordination of care with the medical staff and if applicable 

referring physicians, as well as creation of the medical record


                                                  





- Pain Location


  ** Neck


Non-Pharmacological Interventions: Chiropractic Treatment, Heat, Home Exercise, 

Ice, Massage, Physical Therapy, Position/Reposition, Stretching


Pharmacological Interventions: Block, Epidural, PRN Medication, Scheduled 

Medication, Topical Medication


PQRS Narrative: 


                                        





Smoking Status                   Current every day smoker


Narcotic Agreement Date Signed   12/13/21


Hx Alcohol Use (MH)              No








Home Medications: 


Ambulatory Orders





Citalopram Hydrobromide [CeleXA] 20 mg PO DAILY 05/02/17 


Multivitamins, Thera [Multivitamin (formulary)] 1 tab PO DAILY 08/22/19 


Levothyroxine Sodium [Synthroid] 150 mcg PO DAILY 12/18/20 


Amitriptyline HCl [Elavil] 10 mg PO HS #30 tab 10/18/21 


Meloxicam [Mobic] 7.5 mg PO DAILY 30 Days #30 tab 03/16/22 


HYDROcodone/APAP 5-325MG [Norco 5-325] 1 tab PO Q8HR PRN 30 Days #90 tab 

08/29/22 


Butalbital-ASA-Caffein-Codeine [Fiorinal w/Cod -37-30MG] 1 cap PO Q12H PRN

30 Days #10 cap 10/26/22 


Gabapentin [Neurontin] 100 mg PO TID 30 Days #90 cap 10/26/22 


HYDROcodone/APAP 5-325MG [Norco 5-325] 1 tab PO Q12HR PRN 30 Days #60 tab 

10/26/22 


HYDROcodone/APAP 5-325MG [Norco 5-325] 1 tab PO Q12HR PRN 30 Days #60 tab 

10/26/22 


tiZANidine [Zanaflex] 4 mg PO Q12HR PRN 30 Days #60 tab 10/26/22 











Controlled Substance Measures





- Controlled Substance Measures


Is patient prescribed a controlled substance at discharge?: Yes


When asked, does pt state using other controlled substances?: Yes


If prescribed controlled substance>3 days was MAPS reviewed?: Yes


If Rx opioid, was Start Talking consent form obtained?: Yes


Was information provided regarding opioid addiction?: Yes

## 2022-12-05 ENCOUNTER — HOSPITAL ENCOUNTER (OUTPATIENT)
Dept: HOSPITAL 47 - LABWHC1 | Age: 47
Discharge: HOME | End: 2022-12-05
Attending: INTERNAL MEDICINE
Payer: COMMERCIAL

## 2022-12-05 DIAGNOSIS — E03.8: Primary | ICD-10-CM

## 2022-12-05 LAB — T4 FREE SERPL-MCNC: 1.58 NG/DL (ref 0.8–1.8)

## 2022-12-05 PROCEDURE — 84443 ASSAY THYROID STIM HORMONE: CPT

## 2022-12-05 PROCEDURE — 84439 ASSAY OF FREE THYROXINE: CPT

## 2022-12-05 PROCEDURE — 36415 COLL VENOUS BLD VENIPUNCTURE: CPT

## 2022-12-21 ENCOUNTER — HOSPITAL ENCOUNTER (OUTPATIENT)
Dept: HOSPITAL 47 - PNWHC3 | Age: 47
End: 2022-12-21
Attending: ANESTHESIOLOGY
Payer: COMMERCIAL

## 2022-12-21 VITALS — RESPIRATION RATE: 18 BRPM | HEART RATE: 91 BPM | SYSTOLIC BLOOD PRESSURE: 126 MMHG | DIASTOLIC BLOOD PRESSURE: 66 MMHG

## 2022-12-21 DIAGNOSIS — M47.812: Primary | ICD-10-CM

## 2022-12-21 DIAGNOSIS — M51.36: ICD-10-CM

## 2022-12-21 DIAGNOSIS — F17.200: ICD-10-CM

## 2022-12-21 DIAGNOSIS — Z88.2: ICD-10-CM

## 2022-12-21 PROCEDURE — 99211 OFF/OP EST MAY X REQ PHY/QHP: CPT

## 2022-12-21 NOTE — P.PAINPG
PQRS Measure Charge Sheet


Comment: 





A  47  yr old female with a history of severe and chronic neck & low back pain 

secondary to cervical and lumbar DDD and spondylosis with facet arthropathy 

without myelopathy presents today for LBP and medication refills. Pain level is 

currently at 4 /10 in intensity, constant, localized in the lumbar spine, sharp 

in character w shooting towards the BL hips and RLE. Pain is provoked by 

bending, lifting. Pain is alleviated with  massage therapy as needed, 

chiropractic treatments 1 yr ago, heat, medications, topicals, home exercise 

regimen, repositioning and rest.





Interventional pain procedures completed include CESIs, Lumbar TPIs


Patient is currently on Norco, Neurontin, Fioricet, Zanaflex


Patient denies any side effects of the medication(s), denies excessive 

drowsiness or sleepiness, denies suicidal ideation and reports that the current 

pain medication is  helping to control the  pain and improve activities of daily

living.


Patient denies any motor or sensory deficits. Patient denies any fever or night 

sweats, denies any change in the bowel movements or urination.


 


Physical Examination:


  -Constitutional: Cooperative. Not in acute distress .                         

                                                                                

                                                                                

  


 - Neurologic:  Cranial nerve II to  XII  intact. No focal neurological 

deficits.


 - Psychatric: Alert & oriented x 3. Matching mood & appropriate affect. Judg

ment and insight intact.  


 - Musculoskeletal:     


Cervical spine: 


      Muscle bulk/ tone/ strength in the bilateral upper extremities normal


      Vertebral body tenderness to palpation over 


      Spurling test positive


      Distraction test positive


      Facet loading test positive





Thoracic spine   


     Muscle bulk / tone/ strength in the bilateral paraspinal muscles normal


     Vertebral body tender to palpation over


     Facet loading test positive


                                                                                

                                                                                

                                                                                

                                                                                

                                                                                

    


Lumbar spine: 


     Motor bulk/ tone/ strength  lower extremities , thigh and legs : 5/5 


     Deep tendon reflexes :   Normal  Knee Jerk. Normal Ankle Jerk  .


     Vertebral body tenderness to palpation over L4, L5


     Lumbar Facet Loading Test  positive 


     Straight Leg Raise: positive at  30  degrees right side/ left side 


     Gaenslen's Test positive  





Sacral spine :


     Severe tenderness over the Sacroiliac joint:  right side / left side 


     Range of motion: Flexion of the lumbar spine <60 degrees


     Range of motion: Extension of the lumbar spine <20 degrees


     Gaenslen's Test positive 


     Toni test: positive  right side /  left side


     Thigh Thrust Test


     Sacral Thrust Test





Assessment and plan:


       Chronic neck pain & LBP secondary to lumbar degenerative disc disease, 

spondylosis with facet arthropathy without myelopathy


       Recommendation of MRI without contrast of Lumbar spine Re: M51.36


       Chronic and current use of high-risk medication (Opioids).


       The patient was counseled about risk of opioid use, psychological risk 

associated with opioids and was orally counseled to not overuse ,


       divert or sell medications. Pt is to store medication in a safe location.

                     


       The  patient is counseled against driving while using narcotic 

medications and also not to use alcohol or any illicit recreational drugs. 


       Patient verbalized understanding that the lack of compliance will result 

in failure to renew narcotic prescription(s) as well as possible discharge from 

the clinic


       Diagnoses, prognosis and treatment options including but not limited to 

physical therapy, surgical interventions, interventional therapies and 

medication management including narcotics and adjuvant medication were 

discussed.


       All patient questions answered 


       MAPS reviewed and it was appropriate.


       UDS reviewed and consistent


       Prescription refill for Zanaflex, Norco 5/325mg #60, Neurontin 100mg #90,

Fioricet #10 w 1 RF





I have spent less than 30 minutes on patient care today. Dr Benoit was 

available by phone for the evaluation of this patient. The time was used to 

review the medical records including relevant urine studies and Prescription 

history (MAPs), review of the available imaging, evaluation and examination of 

the patient, coordination of care with the medical staff and if applicable 

referring physicians, as well as creation of the medical record


           


PQRS Narrative: 


                                        





Smoking Status                   Current every day smoker


Narcotic Agreement Date Signed   12/13/21


Hx Alcohol Use (MH)              No








Home Medications: 


Ambulatory Orders





Citalopram Hydrobromide [CeleXA] 20 mg PO DAILY 05/02/17 


Multivitamins, Thera [Multivitamin (formulary)] 1 tab PO DAILY 08/22/19 


Levothyroxine Sodium [Synthroid] 150 mcg PO DAILY 12/18/20 


Amitriptyline HCl [Elavil] 10 mg PO HS #30 tab 10/18/21 


Meloxicam [Mobic] 7.5 mg PO DAILY 30 Days #30 tab 03/16/22 


HYDROcodone/APAP 5-325MG [Norco 5-325] 1 tab PO Q12HR PRN 30 Days #60 tab 

10/26/22 


Butalbital-ASA-Caffein-Codeine [Fiorinal w/Cod -26-30MG] 1 cap PO Q12H PRN

30 Days #10 cap 12/21/22 


Gabapentin [Neurontin] 100 mg PO TID 30 Days #90 cap 12/21/22 


HYDROcodone/APAP 5-325MG [Norco 5-325] 1 tab PO Q12HR PRN 30 Days #60 tab 

12/21/22 


HYDROcodone/APAP 5-325MG [Norco 5-325] 1 tab PO Q8HR PRN 30 Days #90 tab 

12/21/22 


tiZANidine [Zanaflex] 4 mg PO Q12HR PRN 30 Days #60 tab 12/21/22 











Controlled Substance Measures





- Controlled Substance Measures


Is patient prescribed a controlled substance at discharge?: Yes


When asked, does pt state using other controlled substances?: Yes


If prescribed controlled substance>3 days was MAPS reviewed?: Yes


If Rx opioid, was Start Talking consent form obtained?: Yes


Was information provided regarding opioid addiction?: Yes

## 2022-12-28 ENCOUNTER — HOSPITAL ENCOUNTER (OUTPATIENT)
Dept: HOSPITAL 47 - RADMRIMAIN | Age: 47
Discharge: HOME | End: 2022-12-28
Attending: ANESTHESIOLOGY
Payer: COMMERCIAL

## 2022-12-28 DIAGNOSIS — M51.36: Primary | ICD-10-CM

## 2022-12-28 DIAGNOSIS — M47.816: ICD-10-CM

## 2022-12-28 PROCEDURE — 72148 MRI LUMBAR SPINE W/O DYE: CPT

## 2022-12-28 NOTE — MR
EXAMINATION TYPE: MR lumbar spine wo con

 

DATE OF EXAM: 12/28/2022 8:36 PM

 

COMPARISON: 12/24/2019.  

 

CLINICAL INDICATION:Female, 47 years old with history of M51.36 INTERVERTEBRAL DISC DEGENERATION, LUM
BAR RE; PHH,

 

TECHNIQUE:  Multi planar, multi sequence imaging was performed utilizing: T1-weighted, T2-weighted, a
nd turbo inversion recovery imaging of the lumbar spine. 

IV Contrast: None.

 

FINDINGS:  

Alignment: The lumbar vertebral bodies have preserved heights and alignment.  

 

Cord: The conus medullaris and the distal spinal cord appear unremarkable with regards to their signa
l intensity and morphology. 

 

Bones/Discs: High T2/T1 L2 vertebral body hemangioma. Multilevel degenerative disc disease is noted a
nd most pronounced at the L3-L4. Disc desiccation at L4-L5 and L5-S1.

 

L1-L2: No evidence of significant spinal canal stenosis or neural foraminal stenosis. 

 

L2-L3: No evidence of significant spinal canal stenosis or neural foraminal stenosis. 

 

L3-L4: No evidence of significant spinal canal stenosis or neural foraminal stenosis. 

 

L4-L5: Disc bulge and facet joint arthropathy result in mild spinal canal and mild bilateral neural f
oraminal stenosis. 

 

L5-S1: No evidence of significant spinal canal stenosis. Facet joint arthropathy mild bilateral neura
l foraminal stenosis. 

 

Other findings:  None.

 

IMPRESSION:

1.  No definitive evidence of disc herniation or significant spinal canal stenosis. No evidence accou
nt for patient's radiculopathy. No significant change from prior in 2019.

 

2.  Mild disc degeneration with associated osteoarthritic changes.

## 2023-01-05 ENCOUNTER — HOSPITAL ENCOUNTER (OUTPATIENT)
Dept: HOSPITAL 47 - RADMAMWWP | Age: 48
Discharge: HOME | End: 2023-01-05
Attending: OBSTETRICS & GYNECOLOGY
Payer: COMMERCIAL

## 2023-01-05 DIAGNOSIS — Z80.3: ICD-10-CM

## 2023-01-05 DIAGNOSIS — Z12.31: Primary | ICD-10-CM

## 2023-01-05 PROCEDURE — 77067 SCR MAMMO BI INCL CAD: CPT

## 2023-01-05 PROCEDURE — 77063 BREAST TOMOSYNTHESIS BI: CPT

## 2023-01-06 NOTE — MM
Reason for Exam: Screening  (asymptomatic). 

Last mammogram was performed 1 year(s) and 2 month(s) ago. 





Patient History: 

Menarche at age 14. First Full-Term Pregnancy at age 24. Patient used Hormonal Contraceptives for 1

year.

Paternal aunt had breast cancer, age 55.

Last menstrual period: 01/01/2023





Risk Values: 

Ela 5 year model risk: 0.7%.

NCI Lifetime model risk: 8.6%.





Prior Study Comparison: 

9/4/2019 Bilateral Screening Mammogram, [facility]. 10/8/2020 Bilateral Screening Mammogram, [facility].

11/16/2021 Bilateral Screening Mammogram, [facility]. 





Tissue Density: 

There are scattered fibroglandular densities.





Findings: 

Analyzed By CAD. 

Appears symmetrical and stable. No significant interval changes are evident.

No suspicious groups of microcalcifications, spiculated or lobular masses, architectural distortion

or other secondary signs of malignancy are mammographically apparent. 





Overall Assessment: Benign, BI-RAD 2





Management: 

Screening Mammogram of both breasts in 1 year.

A negative mammogram report should not preclude additional follow up of suspicious palpable

abnormalities.

Patient should continue monthly self breast exam.

A clinical breast exam by your physician is recommended on an annual basis and results should be

correlated with mammographic findings.



Electronically signed and approved by: Navdeep Huerta D.O. Radiolog[employer]

## 2023-04-12 ENCOUNTER — HOSPITAL ENCOUNTER (OUTPATIENT)
Dept: HOSPITAL 47 - PNWHC3 | Age: 48
End: 2023-04-12
Attending: ANESTHESIOLOGY
Payer: COMMERCIAL

## 2023-04-12 VITALS
SYSTOLIC BLOOD PRESSURE: 117 MMHG | DIASTOLIC BLOOD PRESSURE: 77 MMHG | HEART RATE: 92 BPM | TEMPERATURE: 98 F | RESPIRATION RATE: 18 BRPM

## 2023-04-12 DIAGNOSIS — Z88.2: ICD-10-CM

## 2023-04-12 DIAGNOSIS — Z79.891: ICD-10-CM

## 2023-04-12 DIAGNOSIS — F17.200: ICD-10-CM

## 2023-04-12 DIAGNOSIS — M47.812: ICD-10-CM

## 2023-04-12 DIAGNOSIS — M50.30: Primary | ICD-10-CM

## 2023-04-12 DIAGNOSIS — G89.29: ICD-10-CM

## 2023-04-12 PROCEDURE — 80307 DRUG TEST PRSMV CHEM ANLYZR: CPT

## 2023-04-12 PROCEDURE — 99212 OFFICE O/P EST SF 10 MIN: CPT

## 2023-04-12 NOTE — P.PAINPG
PQRS Measure Charge Sheet


Comment: 





A  47  yr old female with a history of severe and chronic neck pain since 2013 

secondary to cervical DDD and spondylosis with facet arthropathy without 

myelopathy presents today for medication refills. Pain level is provoked at 4 

/10 in intensity, constant, localized in the cervical spine, stabbing in charact

er w shooting towards the BL shoulders. Pain is provoked by hyperextension. Pain

is alleviated with heat, ice, medications, topical, chiropractic treatments as 

needed, massage therapy every 2 mo, PT years ago, repositioning and rest.





Patient is currently on Norco, Neurontin, Fioricet


Patient denies any side effects of the medication(s), denies excessive 

drowsiness or sleepiness, denies suicidal ideation and reports that the current 

pain medication is  helping to control the  pain and improve activities of daily

living.


Patient denies any motor or sensory deficits. Patient denies any fever or night 

sweats, denies any change in the bowel movements or urination.


 


Physical Examination:


  -Constitutional: Cooperative. Not in acute distress .                         

                                                                                

                                                                                

  


 - Neurologic:  Cranial nerve II to  XII  intact. No focal neurological 

deficits.


 - Psychatric: Alert & oriented x 3. Matching mood & appropriate affect. 

Judgment and insight intact.  


 - Musculoskeletal:     


Cervical spine: 


      Muscle bulk/ tone/ strength in the bilateral upper extremities normal


      Vertebral body tenderness to palpation over 


      Spurling test positive 


      Distraction test positive 


      Facet loading test positive TTP





Thoracic spine   


     Muscle bulk / tone/ strength in the bilateral paraspinal muscles normal


     Vertebral body tender to palpation over 


     Facet loading test positive TTP


                                                                                

                                                                                

                                                                                

                                                                                

                                                                                

    


Lumbar spine: 


     Motor bulk/ tone/ strength  lower extremities , thigh and legs : 5/5 


     Deep tendon reflexes :   Normal  Knee Jerk. Normal Ankle Jerk  .


     Vertebral body tenderness to palpation over 


     Lumbar Facet Loading Test  positive 


     Straight Leg Raise: positive at  30  degrees right side/ left side 


     Gaenslen's Test positive  





Sacral spine :


     Severe tenderness over the Sacroiliac joint:  right side / left side 


     Range of motion: Flexion of the lumbar spine <60 degrees


     Range of motion: Extension of the lumbar spine <20 degrees


     Gaenslen's Test positive R / L


     Toni test: positive  right side /  left side


     Thigh Thrust Test positive R / L


     Sacral Thrust Test positive R/ L





Assessment and plan:


       Chronic neck pain secondary to cervical DDD, spondylosis with facet 

arthropathy without myelopathy


       Chronic and current use of high-risk medication (Opioids).


       The patient was counseled about risk of opioid use, psychological risk 

associated with opioids and was orally counseled to not overuse ,


       divert or sell medications. Pt is to store medication in a safe location.

                     


       The  patient is counseled against driving while using narcotic 

medications and also not to use alcohol or any illicit recreational drugs. 


       Patient verbalized understanding that the lack of compliance will result 

in failure to renew narcotic prescription(s) as well as possible discharge from 

the clinic


       Diagnoses, prognosis and treatment options including but not limited to 

physical therapy, surgical interventions, interventional therapies and 

medication management including narcotics and adjuvant medication were 

discussed.


       All patient questions answered 


       MAPS reviewed and it was appropriate. UDS collected today 4/12/23.


       Prescription refill for Norco 5/325mg #60, Neurontin 100mg #90, Fioricet 

#10 w 1 RF.





I have spent less than 30 minutes on patient care today. Dr Benoit was 

available by phone for the evaluation of this patient. The time was used to 

review the medical records including relevant urine studies and Prescription 

history (MAPs), review of the available imaging, evaluation and examination of 

the patient, coordination of care with the medical staff and if applicable 

referring physicians, as well as creation of the medical record


  





PQRS Narrative: 


                                        





Smoking Status                   Current every day smoker


Narcotic Agreement Date Signed   12/21/22


Hx Alcohol Use (MH)              No








Home Medications: 


Ambulatory Orders





Citalopram Hydrobromide [CeleXA] 20 mg PO DAILY 05/02/17 


Multivitamins, Thera [Multivitamin (formulary)] 1 tab PO DAILY 08/22/19 


Levothyroxine Sodium [Synthroid] 150 mcg PO DAILY 12/18/20 


Amitriptyline HCl [Elavil] 10 mg PO HS #30 tab 10/18/21 


Meloxicam [Mobic] 7.5 mg PO DAILY 30 Days #30 tab 03/16/22 


HYDROcodone/APAP 5-325MG [Norco 5-325] 1 tab PO Q12HR PRN 30 Days #60 tab 

10/26/22 


diazePAM [Valium] 5 mg PO ONCE PRN 1 Days #1 tab 12/28/22 


Butalbital-ASA-Caffein-Codeine [Fiorinal w/Cod -42-30MG] 1 cap PO Q12H PRN

30 Days #10 cap 02/15/23 


Gabapentin [Neurontin] 100 mg PO TID 30 Days #90 cap 02/15/23 


HYDROcodone/APAP 5-325MG [Norco 5-325] 1 tab PO Q12HR PRN 30 Days #60 tab 0

2/15/23 


HYDROcodone/APAP 5-325MG [Norco 5-325] 1 tab PO Q8HR PRN 30 Days #90 tab 

02/15/23 


tiZANidine [Zanaflex] 4 mg PO Q12HR PRN 30 Days #60 tab 02/15/23 











Controlled Substance Measures





- Controlled Substance Measures


Is patient prescribed a controlled substance at discharge?: Yes

## 2023-06-01 ENCOUNTER — HOSPITAL ENCOUNTER (OUTPATIENT)
Dept: HOSPITAL 47 - RADXRMAIN | Age: 48
Discharge: HOME | End: 2023-06-01
Attending: INTERNAL MEDICINE
Payer: COMMERCIAL

## 2023-06-01 DIAGNOSIS — E03.8: Primary | ICD-10-CM

## 2023-06-01 PROCEDURE — 84443 ASSAY THYROID STIM HORMONE: CPT

## 2023-06-14 ENCOUNTER — HOSPITAL ENCOUNTER (OUTPATIENT)
Dept: HOSPITAL 47 - PNWHC3 | Age: 48
End: 2023-06-14
Attending: ANESTHESIOLOGY
Payer: COMMERCIAL

## 2023-06-14 VITALS
DIASTOLIC BLOOD PRESSURE: 71 MMHG | TEMPERATURE: 98.3 F | HEART RATE: 87 BPM | SYSTOLIC BLOOD PRESSURE: 113 MMHG | RESPIRATION RATE: 18 BRPM

## 2023-06-14 DIAGNOSIS — M51.36: Primary | ICD-10-CM

## 2023-06-14 DIAGNOSIS — Z79.891: ICD-10-CM

## 2023-06-14 DIAGNOSIS — F17.200: ICD-10-CM

## 2023-06-14 DIAGNOSIS — M47.816: ICD-10-CM

## 2023-06-14 DIAGNOSIS — Z88.2: ICD-10-CM

## 2023-06-14 PROCEDURE — 99211 OFF/OP EST MAY X REQ PHY/QHP: CPT

## 2023-06-14 NOTE — P.PAINPG
PQRS Measure Charge Sheet


Comment: 





A  47  yr old female with a history of severe and chronic LBP secondary to 

lumbar DDD and spondylosis with facet arthropathy without myelopathy presents 

today for medication refills. Pain level is provoked at 4 /10 in intensity, 

constant, localized in the lumbar spine, stabbing in character w shooting 

towards the BLEs. Pain is provoked by standing from a sitting position. Pain is 

alleviated with heat, ice, medications, topical Rescue gel, chiropractic 

treatments as needed, massage therapy every 2 mo, PT years ago, massage gun use 

at home, repositioning and rest.





Patient is currently on Norco, Neurontin, Fioricet


Patient denies any side effects of the medication(s), denies excessive 

drowsiness or sleepiness, denies suicidal ideation and reports that the current 

pain medication is  helping to control the  pain and improve activities of daily

living.


Patient denies any motor or sensory deficits. Patient denies any fever or night 

sweats, denies any change in the bowel movements or urination.


 


Physical Examination:


  -Constitutional: Cooperative. Not in acute distress .                         

                                                                                

                                                                                

  


 - Neurologic:  Cranial nerve II to  XII  intact. No focal neurological 

deficits.


 - Psychatric: Alert & oriented x 3. Matching mood & appropriate affect. 

Judgment and insight intact.  


 - Musculoskeletal:     


Cervical spine: 


      Muscle bulk/ tone/ strength in the bilateral upper extremities normal


      Vertebral body tenderness to palpation over 


      Spurling test positive 


      Distraction test positive 


      Facet loading test positive TTP





Thoracic spine   


     Muscle bulk / tone/ strength in the bilateral paraspinal muscles normal


     Vertebral body tender to palpation over 


     Facet loading test positive TTP


                                                                                

                                                                                

                                                                                

                                                                                

                                                                                

    


Lumbar spine: 


     Motor bulk/ tone/ strength  lower extremities , thigh and legs : 5/5 


     Deep tendon reflexes :   Normal  Knee Jerk. Normal Ankle Jerk  .


     Vertebral body tenderness to palpation over L4, L5


     Lumbar Facet Loading Test  positive 


     Straight Leg Raise: positive at  30  degrees right side/ left side 


     Gaenslen's Test positive  





Sacral spine :


     Severe tenderness over the Sacroiliac joint:  right side / left side 


     Range of motion: Flexion of the lumbar spine <60 degrees


     Range of motion: Extension of the lumbar spine <20 degrees


     Gaenslen's Test positive R / L


     Toni test: positive  right side /  left side


     Thigh Thrust Test positive R / L


     Sacral Thrust Test positive R/ L





Assessment and plan:


       Chronic LBP secondary to lumbar DDD, spondylosis with facet arthropathy 

without myelopathy


       Chronic and current use of high-risk medication (Opioids).


       The patient was counseled about risk of opioid use, psychological risk 

associated with opioids and was orally counseled to not overuse ,


       divert or sell medications. Pt is to store medication in a safe location.

                     


       The  patient is counseled against driving while using narcotic 

medications and also not to use alcohol or any illicit recreational drugs. 


       Patient verbalized understanding that the lack of compliance will result 

in failure to renew narcotic prescription(s) as well as possible discharge from 

the clinic


       Diagnoses, prognosis and treatment options including but not limited to 

physical therapy, surgical interventions, interventional therapies and 

medication management including narcotics and adjuvant medication were 

discussed.


       All patient questions answered 


       MAPS reviewed and it was appropriate. UDS from 4/12/23 reviewed and 

consistent.


       Prescription refill for Norco 5/325mg #60, Neurontin 100mg #90, Fioricet 

#10, Zanaflex w 1 RF.





I have spent less than 30 minutes on patient care today. Dr Benoit was 

available by phone for the evaluation of this patient. The time was used to 

review the medical records including relevant urine studies and Prescription 

history (MAPs), review of the available imaging, evaluation and examination of 

the patient, coordination of care with the medical staff and if applicable 

referring physicians, as well as creation of the medical record





PQRS Narrative: 


                                        





Smoking Status                   Current every day smoker


Narcotic Agreement Date Signed   12/21/22


Hx Alcohol Use (MH)              No








Home Medications: 


Ambulatory Orders





Citalopram Hydrobromide [CeleXA] 20 mg PO DAILY 05/02/17 


Multivitamins, Thera [Multivitamin (formulary)] 1 tab PO DAILY 08/22/19 


Levothyroxine Sodium [Synthroid] 150 mcg PO DAILY 12/18/20 


Amitriptyline HCl [Elavil] 10 mg PO HS #30 tab 10/18/21 


Meloxicam [Mobic] 7.5 mg PO DAILY 30 Days #30 tab 03/16/22 


diazePAM [Valium] 5 mg PO ONCE PRN 1 Days #1 tab 12/28/22 


HYDROcodone/APAP 5-325MG [Norco 5-325] 1 tab PO Q8HR PRN 30 Days #90 tab 

04/12/23 


Butalbital-ASA-Caffein-Codeine [Fiorinal w/Cod -05-30MG] 1 cap PO Q12H PRN

30 Days #10 cap 06/14/23 


Gabapentin [Neurontin] 100 mg PO TID 30 Days #90 cap 06/14/23 


HYDROcodone/APAP 5-325MG [Norco 5-325] 1 tab PO Q12HR PRN 30 Days #60 tab 

06/14/23 


HYDROcodone/APAP 5-325MG [Norco 5-325] 1 tab PO Q8HR PRN 30 Days #90 tab 

06/14/23 


tiZANidine [Zanaflex] 4 mg PO Q12HR PRN 30 Days #60 tab 06/14/23 











Controlled Substance Measures





- Controlled Substance Measures


Is patient prescribed a controlled substance at discharge?: Yes


When asked, does pt state using other controlled substances?: Yes


If prescribed controlled substance>3 days was MAPS reviewed?: Yes

## 2023-07-31 ENCOUNTER — HOSPITAL ENCOUNTER (OUTPATIENT)
Dept: HOSPITAL 47 - LABWHC1 | Age: 48
Discharge: HOME | End: 2023-07-31
Attending: INTERNAL MEDICINE
Payer: COMMERCIAL

## 2023-07-31 DIAGNOSIS — E03.8: Primary | ICD-10-CM

## 2023-07-31 PROCEDURE — 36415 COLL VENOUS BLD VENIPUNCTURE: CPT

## 2023-07-31 PROCEDURE — 84443 ASSAY THYROID STIM HORMONE: CPT

## 2023-08-02 ENCOUNTER — HOSPITAL ENCOUNTER (OUTPATIENT)
Dept: HOSPITAL 47 - LABWHC1 | Age: 48
Discharge: HOME | End: 2023-08-02
Attending: PHYSICIAN ASSISTANT
Payer: COMMERCIAL

## 2023-08-02 DIAGNOSIS — M79.662: Primary | ICD-10-CM

## 2023-08-02 PROCEDURE — 36415 COLL VENOUS BLD VENIPUNCTURE: CPT

## 2023-08-02 PROCEDURE — 85379 FIBRIN DEGRADATION QUANT: CPT

## 2023-08-09 ENCOUNTER — HOSPITAL ENCOUNTER (OUTPATIENT)
Dept: HOSPITAL 47 - PNWHC3 | Age: 48
End: 2023-08-09
Attending: ANESTHESIOLOGY
Payer: COMMERCIAL

## 2023-08-09 VITALS
SYSTOLIC BLOOD PRESSURE: 123 MMHG | DIASTOLIC BLOOD PRESSURE: 74 MMHG | RESPIRATION RATE: 15 BRPM | TEMPERATURE: 98.4 F | HEART RATE: 96 BPM

## 2023-08-09 DIAGNOSIS — Z88.2: ICD-10-CM

## 2023-08-09 DIAGNOSIS — M47.812: ICD-10-CM

## 2023-08-09 DIAGNOSIS — G89.29: ICD-10-CM

## 2023-08-09 DIAGNOSIS — Z79.891: ICD-10-CM

## 2023-08-09 DIAGNOSIS — F17.200: ICD-10-CM

## 2023-08-09 DIAGNOSIS — M50.30: Primary | ICD-10-CM

## 2023-08-09 PROCEDURE — 99211 OFF/OP EST MAY X REQ PHY/QHP: CPT

## 2023-08-09 NOTE — P.PAINPG
PQRS Measure Charge Sheet


Comment: 





A  47  yr old female with a history of severe and chronic LBP secondary to 

cervical DDD and spondylosis with facet arthropathy without myelopathy presents 

today for medication refills. Pain level is provoked at 8/10 in intensity, 

constant, localized in the cervical spine, stabbing in character w shooting towa

rds the BUEs. Pain is provoked by standing from a sitting position. Pain is 

alleviated with heat, ice, medications, topical Rescue gel, chiropractic 

treatments as needed, massage therapy every 2 mo, PT years ago, massage gun use 

at home, repositioning and rest.





Patient is currently on Norco, Neurontin, Fioricet


Patient denies any side effects of the medication(s), denies excessive 

drowsiness or sleepiness, denies suicidal ideation and reports that the current 

pain medication is  helping to control the  pain and improve activities of daily

living.


Patient denies any motor or sensory deficits. Patient denies any fever or night 

sweats, denies any change in the bowel movements or urination.


 


Physical Examination:


  -Constitutional: Cooperative. Not in acute distress .                         

                                                                                

                                                                                

  


 - Neurologic:  Cranial nerve II to  XII  intact. No focal neurological 

deficits.


 - Psychatric: Alert & oriented x 3. Matching mood & appropriate affect. 

Judgment and insight intact.  


 - Musculoskeletal:     


Cervical spine: 


      Muscle bulk/ tone/ strength in the bilateral upper extremities normal


      Vertebral body tenderness to palpation over 


      Spurling test positive 


      Distraction test positive 


      Facet loading test positive TTP





Thoracic spine   


     Muscle bulk / tone/ strength in the bilateral paraspinal muscles normal


     Vertebral body tender to palpation over 


     Facet loading test positive TTP


                                                                                

                                                                                

                                                                                

                                                                                

                                                                                

    


Lumbar spine: 


     Motor bulk/ tone/ strength  lower extremities , thigh and legs : 5/5 


     Deep tendon reflexes :   Normal  Knee Jerk. Normal Ankle Jerk  .


     Vertebral body tenderness to palpation over L4, L5


     Lumbar Facet Loading Test  positive 


     Straight Leg Raise: positive at  30  degrees right side/ left side 


     Gaenslen's Test positive  





Sacral spine :


     Severe tenderness over the Sacroiliac joint:  right side / left side 


     Range of motion: Flexion of the lumbar spine <60 degrees


     Range of motion: Extension of the lumbar spine <20 degrees


     Gaenslen's Test positive R / L


     Toni test: positive  right side /  left side


     Thigh Thrust Test positive R / L


     Sacral Thrust Test positive R/ L





Assessment and plan:


       Chronic neck pain secondary to cervical DDD, spondylosis with facet 

arthropathy without myelopathy


       Chronic and current use of high-risk medication (Opioids).


       The patient was counseled about risk of opioid use, psychological risk 

associated with opioids and was orally counseled to not overuse ,


       divert or sell medications. Pt is to store medication in a safe location.

                     


       The  patient is counseled against driving while using narcotic 

medications and also not to use alcohol or any illicit recreational drugs. 


       Patient verbalized understanding that the lack of compliance will result 

in failure to renew narcotic prescription(s) as well as possible discharge from 

the clinic


       Diagnoses, prognosis and treatment options including but not limited to 

physical therapy, surgical interventions, interventional therapies and 

medication management including narcotics and adjuvant medication were 

discussed.


       All patient questions answered 


       MAPS reviewed and it was appropriate. UDS from 4/12/23 reviewed and 

consistent.


       Prescription refill for Tyl #3 #60, Neurontin 100mg #90, Fioricet #10, 

Zanaflex w 1 RF.





I have spent less than 30 minutes on patient care today. Dr Benoit was 

available by phone for the evaluation of this patient. The time was used to 

review the medical records including relevant urine studies and Prescription 

history (MAPs), review of the available imaging, evaluation and examination of 

the patient, coordination of care with the medical staff and if applicable 

referring physicians, as well as creation of the medical record





PQRS Narrative: 


                                        





Smoking Status                   Current every day smoker


Narcotic Agreement Date Signed   12/21/22


Hx Alcohol Use (MH)              No








Home Medications: 


Ambulatory Orders





Citalopram Hydrobromide [CeleXA] 40 mg PO DAILY 05/02/17 


Multivitamins, Thera [Multivitamin (formulary)] 1 tab PO DAILY 08/22/19 


Levothyroxine Sodium [Synthroid] 150 mcg PO DAILY 12/18/20 


Acetaminophen-Codeine 300-30mg [Tylenol w/codeine #3] 1 tab PO BID PRN 30 Days 

#60 tablet 08/09/23 


Butalbital-ASA-Caffein-Codeine [Fiorinal w/Cod -09-30MG] 1 cap PO Q12H PRN

30 Days #10 cap 08/09/23 


Gabapentin [Neurontin] 100 mg PO TID 30 Days #90 cap 08/09/23 


tiZANidine [Zanaflex] 4 mg PO Q12HR PRN 30 Days #60 tab 08/09/23 











Controlled Substance Measures





- Controlled Substance Measures


Is patient prescribed a controlled substance at discharge?: Yes


When asked, does pt state using other controlled substances?: Yes


If prescribed controlled substance>3 days was MAPS reviewed?: Yes

## 2023-10-30 ENCOUNTER — HOSPITAL ENCOUNTER (OUTPATIENT)
Dept: HOSPITAL 47 - LABWHC1 | Age: 48
Discharge: HOME | End: 2023-10-30
Attending: INTERNAL MEDICINE
Payer: COMMERCIAL

## 2023-10-30 DIAGNOSIS — E55.9: ICD-10-CM

## 2023-10-30 DIAGNOSIS — E03.8: Primary | ICD-10-CM

## 2023-10-30 PROCEDURE — 84443 ASSAY THYROID STIM HORMONE: CPT

## 2023-10-30 PROCEDURE — 36415 COLL VENOUS BLD VENIPUNCTURE: CPT

## 2023-10-30 PROCEDURE — 82306 VITAMIN D 25 HYDROXY: CPT

## 2024-02-01 ENCOUNTER — HOSPITAL ENCOUNTER (OUTPATIENT)
Dept: HOSPITAL 47 - RADMAMWWP | Age: 49
Discharge: HOME | End: 2024-02-01
Attending: OBSTETRICS & GYNECOLOGY
Payer: COMMERCIAL

## 2024-02-01 DIAGNOSIS — Z12.31: Primary | ICD-10-CM

## 2024-02-01 DIAGNOSIS — E03.8: ICD-10-CM

## 2024-02-01 DIAGNOSIS — Z80.3: ICD-10-CM

## 2024-02-01 PROCEDURE — 77067 SCR MAMMO BI INCL CAD: CPT

## 2024-02-01 PROCEDURE — 84443 ASSAY THYROID STIM HORMONE: CPT

## 2024-02-01 PROCEDURE — 36415 COLL VENOUS BLD VENIPUNCTURE: CPT

## 2024-02-01 PROCEDURE — 77063 BREAST TOMOSYNTHESIS BI: CPT

## 2024-02-01 NOTE — MM
Reason for Exam: Screening  (asymptomatic). 

Last mammogram was performed 1 year(s) and 1 month(s) ago. 





Patient History: 

Menarche at age 14. First Full-Term Pregnancy at age 24. Premenopausal. Patient used Hormonal

Contraceptives for 1 year.

Paternal aunt had breast cancer, age 55. 





Risk Values: 

Ela 5 year model risk: 0.8%.

NCI Lifetime model risk: 8.5%.





Prior Study Comparison: 

10/8/2020 Bilateral Screening Mammogram, [facility]. 11/16/2021 Bilateral Screening Mammogram, [facility].

1/5/2023 Bilateral MG 3D screening mammo w/cad, [facility]. 





Tissue Density: 

The breast tissue is almost entirely fat.





Findings: 

Analyzed By CAD. 

There is no suspicious group of microcalcifications or new suspicious mass. 





Overall Assessment: Negative, BI-RAD 1





Management: 

Screening Mammogram of both breasts in 1 year.

[facility] will attempt to contact patient to return for supplemental views and

ultrasound if indicated.



Patient should continue monthly self-breast exams.  A clinical breast exam by your physician is

recommended on an annual basis.

This exam should not preclude additional follow-up of suspicious palpable abnormalities.



Note on Ela scores and lifetime risk:

1. A Ela score greater than 3% is considered moderate risk. If this is the case, consider

specialist referral to assess eligibility for a risk reducing agent.

2. If overall lifetime risk for the development of breast cancer is 20% or higher, the patient may

qualify for future screening with alternating mammogram and breast MRI.



Electronically signed and approved by: Harpreet Carlson DO

## 2024-02-28 ENCOUNTER — HOSPITAL ENCOUNTER (OUTPATIENT)
Dept: HOSPITAL 47 - LABPAT | Age: 49
Discharge: HOME | End: 2024-02-28
Attending: OBSTETRICS & GYNECOLOGY
Payer: COMMERCIAL

## 2024-02-28 DIAGNOSIS — Z01.812: Primary | ICD-10-CM

## 2024-02-28 LAB
BASOPHILS # BLD AUTO: 0.04 X 10*3/UL (ref 0–0.1)
BASOPHILS NFR BLD AUTO: 0.6 %
EOSINOPHIL # BLD AUTO: 0.39 X 10*3/UL (ref 0.04–0.35)
EOSINOPHIL NFR BLD AUTO: 5.8 %
ERYTHROCYTE [DISTWIDTH] IN BLOOD BY AUTOMATED COUNT: 4.2 X 10*6/UL (ref 4.1–5.2)
ERYTHROCYTE [DISTWIDTH] IN BLOOD: 13.8 % (ref 11.5–14.5)
HCT VFR BLD AUTO: 39.7 % (ref 37.2–46.3)
HGB BLD-MCNC: 12.9 G/DL (ref 12–15)
IMM GRANULOCYTES BLD QL AUTO: 0.6 %
LYMPHOCYTES # SPEC AUTO: 2.33 X 10*3/UL (ref 0.9–5)
LYMPHOCYTES NFR SPEC AUTO: 34.4 %
MCH RBC QN AUTO: 30.7 PG (ref 27–32)
MCHC RBC AUTO-ENTMCNC: 32.5 G/DL (ref 32–37)
MCV RBC AUTO: 94.5 FL (ref 80–97)
MONOCYTES # BLD AUTO: 0.43 X 10*3/UL (ref 0.2–1)
MONOCYTES NFR BLD AUTO: 6.4 %
NEUTROPHILS # BLD AUTO: 3.54 X 10*3/UL (ref 1.8–7.7)
NEUTROPHILS NFR BLD AUTO: 52.2 %
NRBC BLD AUTO-RTO: 0 X 10*3/UL (ref 0–0.01)
PLATELET # BLD AUTO: 290 X 10*3/UL (ref 140–440)
WBC # BLD AUTO: 6.77 X 10*3/UL (ref 4.5–10)

## 2024-02-28 PROCEDURE — 85025 COMPLETE CBC W/AUTO DIFF WBC: CPT

## 2024-05-09 ENCOUNTER — HOSPITAL ENCOUNTER (OUTPATIENT)
Dept: HOSPITAL 47 - LABWHC1 | Age: 49
Discharge: HOME | End: 2024-05-09
Attending: INTERNAL MEDICINE
Payer: COMMERCIAL

## 2024-05-09 DIAGNOSIS — Z83.3: ICD-10-CM

## 2024-05-09 DIAGNOSIS — E66.9: ICD-10-CM

## 2024-05-09 DIAGNOSIS — E03.8: Primary | ICD-10-CM

## 2024-05-09 PROCEDURE — 83036 HEMOGLOBIN GLYCOSYLATED A1C: CPT

## 2024-05-09 PROCEDURE — 84443 ASSAY THYROID STIM HORMONE: CPT

## 2024-05-09 PROCEDURE — 36415 COLL VENOUS BLD VENIPUNCTURE: CPT

## 2024-08-01 ENCOUNTER — HOSPITAL ENCOUNTER (OUTPATIENT)
Dept: HOSPITAL 47 - LABWHC1 | Age: 49
Discharge: HOME | End: 2024-08-01
Attending: INTERNAL MEDICINE
Payer: COMMERCIAL

## 2024-08-01 DIAGNOSIS — E03.8: Primary | ICD-10-CM

## 2024-08-01 PROCEDURE — 36415 COLL VENOUS BLD VENIPUNCTURE: CPT

## 2024-08-01 PROCEDURE — 84443 ASSAY THYROID STIM HORMONE: CPT

## 2024-11-04 ENCOUNTER — HOSPITAL ENCOUNTER (OUTPATIENT)
Dept: HOSPITAL 47 - LABWHC1 | Age: 49
Discharge: HOME | End: 2024-11-04
Attending: FAMILY MEDICINE
Payer: COMMERCIAL

## 2024-11-04 DIAGNOSIS — E03.8: Primary | ICD-10-CM

## 2024-11-04 PROCEDURE — 84443 ASSAY THYROID STIM HORMONE: CPT

## 2024-11-04 PROCEDURE — 36415 COLL VENOUS BLD VENIPUNCTURE: CPT

## 2024-12-23 ENCOUNTER — HOSPITAL ENCOUNTER (OUTPATIENT)
Dept: HOSPITAL 47 - PNWHC3 | Age: 49
End: 2024-12-23
Attending: ANESTHESIOLOGY
Payer: COMMERCIAL

## 2024-12-23 VITALS
TEMPERATURE: 98 F | DIASTOLIC BLOOD PRESSURE: 65 MMHG | RESPIRATION RATE: 19 BRPM | SYSTOLIC BLOOD PRESSURE: 104 MMHG | HEART RATE: 87 BPM

## 2024-12-23 DIAGNOSIS — M54.81: Primary | ICD-10-CM

## 2024-12-23 DIAGNOSIS — M47.26: ICD-10-CM

## 2024-12-23 DIAGNOSIS — F17.200: ICD-10-CM

## 2024-12-23 DIAGNOSIS — Z88.2: ICD-10-CM

## 2024-12-23 DIAGNOSIS — Z79.891: ICD-10-CM

## 2024-12-23 DIAGNOSIS — G44.86: ICD-10-CM

## 2024-12-23 PROCEDURE — 99211 OFF/OP EST MAY X REQ PHY/QHP: CPT

## 2024-12-23 NOTE — P.PAINPG
PQRS Measure Charge Sheet


Comment: 





A  49  yr old female with a history of severe and chronic LBP secondary to 

radiculopathy, spondylosis with facet arthropathy without myelopathy presents 

today for evaluation. Pain level is provoked at 8/10 in intensity, constant, 

predominantly axial, localized in the cervical spine, stabbing in character w oc

casional shooting towards the top of the head. Pain is provoked by standing from

a sitting position. Pain is alleviated with heat, ice, medications, topical 

Rescue gel, chiropractic treatments as needed, massage therapy every 2 mo, PT 

years ago, massage gun use at home, repositioning and rest.





Interventional procedures include Subscapular TPI (2019), JAZZMINE L4-L5 (2020)


Patient is currently on Norco, Neurontin, Fioricet


Patient denies any side effects of the medication(s), denies excessive 

drowsiness or sleepiness, denies suicidal ideation and reports that the current 

pain medication is  helping to control the  pain and improve activities of daily

living.


Patient denies any motor or sensory deficits. Patient denies any fever or night 

sweats, denies any change in the bowel movements or urination.


 


Physical Examination:


  -Constitutional: Cooperative. Not in acute distress .                         

                                                                                

                                                                                

  


 - Neurologic:  Cranial nerve II to  XII  intact. No focal neurological 

deficits.


 - Psychatric: Alert & oriented x 3. Matching mood & appropriate affect. 

Judgment and insight intact.  


 - Musculoskeletal:     


Cervical spine: 


      Muscle bulk/ tone/ strength in the bilateral upper extremities normal


      Vertebral body tenderness to palpation over 


      Spurling test positive 


      Distraction test positive 


      Facet loading test positive TTP





Thoracic spine   


     Muscle bulk / tone/ strength in the bilateral paraspinal muscles normal


     Vertebral body tender to palpation over 


     Facet loading test positive TTP


                                                                                

                                                                                

                                                                                

                                                                                

                                                                                

    


Lumbar spine: 


     Motor bulk/ tone/ strength  lower extremities , thigh and legs : 5/5 


     Deep tendon reflexes :   Normal  Knee Jerk. Normal Ankle Jerk  .


     Vertebral body tenderness to palpation over L4, L5


     Lumbar Facet Loading Test  positive 


     Straight Leg Raise: positive at  30  degrees right side/ left side 


     Gaenslen's Test positive  





Sacral spine :


     Severe tenderness over the Sacroiliac joint:  right side / left side 


     Range of motion: Flexion of the lumbar spine <60 degrees


     Range of motion: Extension of the lumbar spine <20 degrees


     Gaenslen's Test positive R / L


     Toni test: positive  right side /  left side


     Thigh Thrust Test positive R / L


     Sacral Thrust Test positive R/ L





Assessment and plan:


       Chronic neck pain secondary to occipital neuralgia, cervicogenic HA, 

radiculopathy, spondylosis with facet arthropathy without myelopathy


       Chronic and current use of high-risk medication (Opioids).


       The patient was counseled about risk of opioid use, psychological risk 

associated with opioids and was orally counseled to not overuse ,


       divert or sell medications. Pt is to store medication in a safe location.

                     


       The  patient is counseled against driving while using narcotic 

medications and also not to use alcohol or any illicit recreational drugs. 


       Patient verbalized understanding that the lack of compliance will result 

in failure to renew narcotic prescription(s) as well as possible discharge from 

the clinic


       Diagnoses, prognosis and treatment options including but not limited to 

physical therapy, surgical interventions, interventional therapies and 

medication management including narcotics and adjuvant medication were 

discussed.


       All patient questions answered 


       MAPS reviewed and it was appropriate. UDS from 4/12/23 reviewed and 

consistent.


       Will follow up w neurology. Prescription refill for Zanaflex w 1 RF.





I have spent less than 30 minutes on patient care today. Dr Benoit was 

available by phone for the evaluation of this patient. The time was used to 

review the medical records including relevant urine studies and Prescription 

history (MAPs), review of the available imaging, evaluation and examination of 

the patient, coordination of care with the medical staff and if applicable 

referring physicians, as well as creation of the medical record


   





- Pain Location


  ** Neck


Non-Pharmacological Interventions: Ice, Position/Reposition


PQRS Narrative: 


                                        





Smoking Status                   Current every day smoker


Narcotic Agreement Date Signed   12/21/22


Hx Alcohol Use (MH)              No








Home Medications: 


Ambulatory Orders





Citalopram Hydrobromide [CeleXA] 40 mg PO HS 05/02/17 


Multivitamins, Thera [Multivitamin (formulary)] 1 tab PO QAM 08/22/19 


Levothyroxine Sodium [Synthroid] 150 mcg PO QAM 12/18/20 


Acetaminophen-Codeine 300-30mg [Tylenol w/codeine #3] 1 tab PO Q6H PRN 08/09/23 


Butalbital-ASA-Caffein-Codeine [Fiorinal w/Cod -35-30MG] 1 cap PO Q12H PRN

30 Days #10 cap 08/09/23 


tiZANidine [Zanaflex] 4 mg PO Q12HR PRN 30 Days #60 tab 08/09/23 


Gabapentin [Neurontin] 100 mg PO BID 02/28/24 


Zetbound 5 mg SQ MO 02/28/24 











Controlled Substance Measures





- Controlled Substance Measures


Is patient prescribed a controlled substance at discharge?: No

## 2025-04-14 ENCOUNTER — HOSPITAL ENCOUNTER (OUTPATIENT)
Dept: HOSPITAL 47 - PNWHC3 | Age: 50
End: 2025-04-14
Attending: ANESTHESIOLOGY
Payer: COMMERCIAL

## 2025-04-14 VITALS — RESPIRATION RATE: 16 BRPM

## 2025-04-14 VITALS — SYSTOLIC BLOOD PRESSURE: 112 MMHG | HEART RATE: 87 BPM | DIASTOLIC BLOOD PRESSURE: 70 MMHG

## 2025-04-14 DIAGNOSIS — G89.29: ICD-10-CM

## 2025-04-14 DIAGNOSIS — Z88.2: ICD-10-CM

## 2025-04-14 DIAGNOSIS — M54.81: ICD-10-CM

## 2025-04-14 DIAGNOSIS — M47.26: Primary | ICD-10-CM

## 2025-04-14 DIAGNOSIS — G44.86: ICD-10-CM

## 2025-04-14 DIAGNOSIS — F17.200: ICD-10-CM

## 2025-04-14 PROCEDURE — 99211 OFF/OP EST MAY X REQ PHY/QHP: CPT

## 2025-04-24 ENCOUNTER — HOSPITAL ENCOUNTER (OUTPATIENT)
Dept: HOSPITAL 47 - RADMRIMAIN | Age: 50
Discharge: HOME | End: 2025-04-24
Attending: ANESTHESIOLOGY
Payer: COMMERCIAL

## 2025-04-24 DIAGNOSIS — M50.122: Primary | ICD-10-CM

## 2025-04-24 DIAGNOSIS — M99.71: ICD-10-CM

## 2025-04-24 PROCEDURE — 72141 MRI NECK SPINE W/O DYE: CPT

## 2025-05-01 ENCOUNTER — HOSPITAL ENCOUNTER (OUTPATIENT)
Dept: HOSPITAL 47 - PNWHC3 | Age: 50
End: 2025-05-01
Attending: ANESTHESIOLOGY
Payer: COMMERCIAL

## 2025-05-01 VITALS — SYSTOLIC BLOOD PRESSURE: 108 MMHG | DIASTOLIC BLOOD PRESSURE: 65 MMHG | RESPIRATION RATE: 16 BRPM | HEART RATE: 83 BPM

## 2025-05-01 DIAGNOSIS — M47.26: ICD-10-CM

## 2025-05-01 DIAGNOSIS — F17.200: ICD-10-CM

## 2025-05-01 DIAGNOSIS — G44.86: ICD-10-CM

## 2025-05-01 DIAGNOSIS — M47.22: ICD-10-CM

## 2025-05-01 DIAGNOSIS — M54.81: Primary | ICD-10-CM

## 2025-05-01 DIAGNOSIS — Z88.2: ICD-10-CM

## 2025-05-01 PROCEDURE — 99211 OFF/OP EST MAY X REQ PHY/QHP: CPT

## 2025-05-12 VITALS — BODY MASS INDEX: 30.4 KG/M2

## 2025-05-15 ENCOUNTER — HOSPITAL ENCOUNTER (OUTPATIENT)
Dept: HOSPITAL 47 - RADMRIMAIN | Age: 50
Discharge: HOME | End: 2025-05-15
Attending: ORTHOPAEDIC SURGERY
Payer: COMMERCIAL

## 2025-05-15 ENCOUNTER — HOSPITAL ENCOUNTER (OUTPATIENT)
Dept: HOSPITAL 47 - ORPAIN | Age: 50
Discharge: HOME | End: 2025-05-15
Attending: ANESTHESIOLOGY
Payer: COMMERCIAL

## 2025-05-15 VITALS — TEMPERATURE: 97.3 F

## 2025-05-15 VITALS — SYSTOLIC BLOOD PRESSURE: 120 MMHG | HEART RATE: 80 BPM | DIASTOLIC BLOOD PRESSURE: 82 MMHG | RESPIRATION RATE: 14 BRPM

## 2025-05-15 DIAGNOSIS — M47.26: ICD-10-CM

## 2025-05-15 DIAGNOSIS — Z88.2: ICD-10-CM

## 2025-05-15 DIAGNOSIS — M48.061: Primary | ICD-10-CM

## 2025-05-15 DIAGNOSIS — M50.13: Primary | ICD-10-CM

## 2025-05-15 DIAGNOSIS — M47.22: ICD-10-CM

## 2025-05-15 DIAGNOSIS — M51.17: ICD-10-CM

## 2025-05-15 PROCEDURE — 62321 NJX INTERLAMINAR CRV/THRC: CPT

## 2025-05-15 PROCEDURE — 72148 MRI LUMBAR SPINE W/O DYE: CPT

## 2025-05-15 PROCEDURE — 81025 URINE PREGNANCY TEST: CPT

## 2025-05-19 ENCOUNTER — HOSPITAL ENCOUNTER (OUTPATIENT)
Dept: HOSPITAL 47 - LABWHC1 | Age: 50
Discharge: HOME | End: 2025-05-19
Attending: INTERNAL MEDICINE
Payer: COMMERCIAL

## 2025-05-19 DIAGNOSIS — E03.8: Primary | ICD-10-CM

## 2025-05-19 PROCEDURE — 36415 COLL VENOUS BLD VENIPUNCTURE: CPT

## 2025-05-19 PROCEDURE — 84443 ASSAY THYROID STIM HORMONE: CPT

## 2025-06-16 ENCOUNTER — HOSPITAL ENCOUNTER (OUTPATIENT)
Dept: HOSPITAL 47 - PNWHC3 | Age: 50
End: 2025-06-16
Attending: ANESTHESIOLOGY
Payer: COMMERCIAL

## 2025-06-16 VITALS
RESPIRATION RATE: 16 BRPM | SYSTOLIC BLOOD PRESSURE: 100 MMHG | TEMPERATURE: 97.7 F | HEART RATE: 99 BPM | DIASTOLIC BLOOD PRESSURE: 69 MMHG

## 2025-06-16 DIAGNOSIS — G44.86: ICD-10-CM

## 2025-06-16 DIAGNOSIS — M47.26: Primary | ICD-10-CM

## 2025-06-16 DIAGNOSIS — F17.200: ICD-10-CM

## 2025-06-16 DIAGNOSIS — M47.22: ICD-10-CM

## 2025-06-16 DIAGNOSIS — M54.81: ICD-10-CM

## 2025-06-16 DIAGNOSIS — Z88.2: ICD-10-CM

## 2025-06-16 PROCEDURE — 99211 OFF/OP EST MAY X REQ PHY/QHP: CPT

## 2025-06-24 ENCOUNTER — HOSPITAL ENCOUNTER (OUTPATIENT)
Dept: HOSPITAL 47 - LABPAT | Age: 50
Discharge: HOME | End: 2025-06-24
Attending: ORTHOPAEDIC SURGERY
Payer: COMMERCIAL

## 2025-06-24 DIAGNOSIS — R58: ICD-10-CM

## 2025-06-24 DIAGNOSIS — Z22.322: ICD-10-CM

## 2025-06-24 DIAGNOSIS — Z79.899: ICD-10-CM

## 2025-06-24 DIAGNOSIS — Z01.818: Primary | ICD-10-CM

## 2025-06-24 DIAGNOSIS — I10: ICD-10-CM

## 2025-06-24 DIAGNOSIS — M51.26: ICD-10-CM

## 2025-06-24 LAB
ACANTHOCYTES BLD QL SMEAR: (no result)
AGRAN PLATELETS BLD QL SMEAR: (no result)
ALBUMIN SERPL-MCNC: 4.2 G/DL (ref 3.8–4.9)
ALBUMIN/GLOB SERPL: 1.68 RATIO (ref 1.6–3.17)
ALP SERPL-CCNC: 85 U/L (ref 41–126)
ALT SERPL-CCNC: 23 U/L (ref 8–44)
ANION GAP SERPL CALC-SCNC: 9.9 MMOL/L (ref 4–12)
ANISOCYTOSIS BLD QL SMEAR: (no result)
ANISOCYTOSIS BLD QL: (no result)
AST SERPL-CCNC: 19 U/L (ref 13–35)
AUER BODIES BLD QL SMEAR: (no result)
BASO STIPL BLD QL SMEAR: (no result)
BASOPHILS # BLD AUTO: 0.06 X 10*3/UL (ref 0–0.1)
BASOPHILS # BLD MANUAL: (no result) K/UL (ref 0–0.2)
BASOPHILS NFR BLD AUTO: 0.7 %
BASOPHILS NFR SPEC MANUAL: (no result) %
BILIRUB BLD-MCNC: 0.4 MG/DL (ref 0.3–1.2)
BLASTS # BLD MANUAL: (no result) %
BLASTS # BLD MANUAL: (no result) K/UL
BUN SERPL-SCNC: 14.8 MG/DL (ref 9–27)
BUN/CREAT SERPL: 24.67 RATIO (ref 12–20)
BURR CELLS BLD QL SMEAR: (no result)
BURR CELLS BLD QL SMEAR: (no result)
CALCIUM SPEC-MCNC: 9.3 MG/DL (ref 8.7–10.3)
CELLS COUNTED: (no result)
CHLORIDE SERPL-SCNC: 102 MMOL/L (ref 96–109)
CO2 SERPL-SCNC: 28.1 MMOL/L (ref 21.6–31.8)
CREATININE: 0.6 MG/DL (ref 0.6–1.5)
DACRYOCYTES BLD QL SMEAR: (no result)
DOHLE BOD BLD QL SMEAR: (no result)
ELLIPTOCYTES BLD QL SMEAR: (no result)
EOSINOPHIL # BLD AUTO: 0.82 X 10*3/UL (ref 0.04–0.35)
EOSINOPHIL # BLD MANUAL: (no result) K/UL (ref 0–0.7)
EOSINOPHIL NFR BLD AUTO: 10 %
EOSINOPHIL NFR BLD MANUAL: (no result) %
ERYTHROCYTE [DISTWIDTH] IN BLOOD BY AUTOMATED COUNT: 4.1 X 10*6/UL (ref 4.1–5.2)
ERYTHROCYTE [DISTWIDTH] IN BLOOD: 13.6 % (ref 11.5–14.5)
GIANT PLATELETS BLD QL SMEAR: (no result)
GLOBULIN SER CALC-MCNC: 2.5 G/DL (ref 1.6–3.3)
GLUCOSE SERPL-MCNC: 87 MG/DL (ref 70–110)
HCT VFR BLD AUTO: 38.5 % (ref 37.2–46.3)
HELMET CELLS BLD QL SMEAR: (no result)
HGB BLD-MCNC: 12.5 G/DL (ref 12–15)
HOWELL-JOLLY BOD BLD QL SMEAR: (no result)
HYPERCHROMASIA: (no result)
HYPOCHROMIA BLD QL SMEAR: (no result)
HYPOCHROMIA BLD QL: (no result)
IMM GRANULOCYTES # BLD: 0.03 X 10*3/UL (ref 0–0.04)
IMM GRANULOCYTES BLD QL AUTO: 0.4 %
INR PPP: 0.96 SEC (ref 0.93–1.11)
LG PLATELETS BLD QL SMEAR: (no result)
LYMPHOCYTES # BLD MANUAL: (no result) K/UL (ref 1–4.8)
LYMPHOCYTES # SPEC AUTO: 2.67 X 10*3/UL (ref 0.9–5)
LYMPHOCYTES NFR BLD MANUAL: (no result) %
LYMPHOCYTES NFR SPEC AUTO: 32.5 %
Lab: (no result)
Lab: (no result)
MACROCYTES BLD QL SMEAR: (no result)
MACROCYTES BLD QL: (no result)
MCH RBC QN AUTO: 30.5 PG (ref 27–32)
MCHC RBC AUTO-ENTMCNC: 32.5 G/DL (ref 32–37)
MCV RBC AUTO: 93.9 FL (ref 80–97)
METAMYELOCYTES # BLD: (no result) K/UL
METAMYELOCYTES NFR BLD MANUAL: (no result) %
MICROCYTES BLD QL SMEAR: (no result)
MICROCYTOSIS: (no result)
MONOCYTES # BLD AUTO: 0.6 X 10*3/UL (ref 0.2–1)
MONOCYTES # BLD MANUAL: (no result) K/UL (ref 0–1)
MONOCYTES NFR BLD AUTO: 7.3 %
MONOCYTES NFR BLD MANUAL: (no result) %
MYELOCYTES # BLD MANUAL: (no result) K/UL
MYELOCYTES NFR BLD MANUAL: (no result) %
NEUTROPHILS # BLD AUTO: 4.03 X 10*3/UL (ref 1.8–7.7)
NEUTROPHILS NFR BLD AUTO: 49.1 %
NEUTROPHILS NFR BLD MANUAL: (no result) %
NEUTS BAND # BLD MANUAL: (no result) K/UL
NEUTS BAND NFR BLD: (no result) %
NEUTS HYPERSEG # BLD: (no result) 10*3/UL
NEUTS SEG # BLD MANUAL: (no result) K/UL (ref 1.3–7.7)
NRBC # BLD: (no result) /100 WBC (ref 0–0)
NRBC BLD AUTO-RTO: 0 X 10*3/UL (ref 0–0.01)
OTHER CELLS NFR BLD MANUAL: (no result) %
OVALOCYTES BLD QL SMEAR: (no result)
PAPPENHEIMER BOD BLD QL SMEAR: (no result)
PARASITE [PRESENCE] IN BLOOD BY LIGHT MICROSCOPY: (no result)
PELGER HUET CELLS BLD QL SMEAR: (no result)
PLASMA CELLS # BLD MANUAL: (no result) %
PLASMA CELLS # BLD MANUAL: (no result) K/UL
PLATELET # BLD AUTO: 324 X 10*3/UL (ref 140–440)
PLATELETS.RETICULATED NFR BLD AUTO: (no result) %
PMV BLD AUTO: 10 FL (ref 9.5–12.2)
POIKILOCYTOSIS BLD QL SMEAR: (no result)
POIKILOCYTOSIS BLD QL SMEAR: (no result)
POIKILOCYTOSIS: (no result)
POLYCHROMASIA BLD QL SMEAR: (no result)
POTASSIUM SERPL-SCNC: 4.2 MMOL/L (ref 3.5–5.5)
PROMYELOCYTES # BLD: (no result) K/UL
PROMYELOCYTES NFR BLD MANUAL: (no result) %
PROT SERPL-MCNC: 6.7 G/DL (ref 6.2–8.2)
PT BLD: 11 SEC (ref 9.9–11.9)
RBC AGGLUTINATION: (no result)
RBC MORPH BLD: (no result)
ROULEAUX BLD QL SMEAR: (no result)
SCHISTOCYTES BLD QL SMEAR: (no result)
SCHISTOCYTES BLD QL SMEAR: (no result)
SICKLE CELLS BLD QL SMEAR: (no result)
SMUDGE CELLS BLD QL SMEAR: (no result)
SODIUM SERPL-SCNC: 140 MMOL/L (ref 135–145)
SPHEROCYTES BLD QL SMEAR: (no result)
STOMATOCYTES BLD QL SMEAR: (no result)
TARGETS BLD QL SMEAR: (no result)
TOXIC GRANULES BLD QL SMEAR: (no result)
VARIANT LYMPHS BLD QL SMEAR: (no result)
VARIANT LYMPHS BLD QL SMEAR: (no result)
WBC # BLD AUTO: 8.21 X 10*3/UL (ref 4.5–10)
WBC NRBC COR # BLD: (no result) K/UL
WBC TOXIC VACUOLES BLD QL SMEAR: (no result)

## 2025-06-24 PROCEDURE — 85025 COMPLETE CBC W/AUTO DIFF WBC: CPT

## 2025-06-24 PROCEDURE — 86900 BLOOD TYPING SEROLOGIC ABO: CPT

## 2025-06-24 PROCEDURE — 93005 ELECTROCARDIOGRAM TRACING: CPT

## 2025-06-24 PROCEDURE — 86901 BLOOD TYPING SEROLOGIC RH(D): CPT

## 2025-06-24 PROCEDURE — 85610 PROTHROMBIN TIME: CPT

## 2025-06-24 PROCEDURE — 86850 RBC ANTIBODY SCREEN: CPT

## 2025-06-24 PROCEDURE — 87070 CULTURE OTHR SPECIMN AEROBIC: CPT

## 2025-06-24 PROCEDURE — 80053 COMPREHEN METABOLIC PANEL: CPT

## 2025-07-01 ENCOUNTER — HOSPITAL ENCOUNTER (OUTPATIENT)
Dept: HOSPITAL 47 - OR | Age: 50
Discharge: HOME | End: 2025-07-01
Attending: ORTHOPAEDIC SURGERY
Payer: COMMERCIAL

## 2025-07-01 VITALS — HEART RATE: 80 BPM | SYSTOLIC BLOOD PRESSURE: 103 MMHG | DIASTOLIC BLOOD PRESSURE: 59 MMHG

## 2025-07-01 VITALS — RESPIRATION RATE: 16 BRPM

## 2025-07-01 VITALS — TEMPERATURE: 97 F

## 2025-07-01 DIAGNOSIS — Z88.2: ICD-10-CM

## 2025-07-01 DIAGNOSIS — E78.5: ICD-10-CM

## 2025-07-01 DIAGNOSIS — Z79.899: ICD-10-CM

## 2025-07-01 DIAGNOSIS — Z82.3: ICD-10-CM

## 2025-07-01 DIAGNOSIS — Z79.890: ICD-10-CM

## 2025-07-01 DIAGNOSIS — F17.210: ICD-10-CM

## 2025-07-01 DIAGNOSIS — Z90.89: ICD-10-CM

## 2025-07-01 DIAGNOSIS — K21.9: ICD-10-CM

## 2025-07-01 DIAGNOSIS — E07.9: ICD-10-CM

## 2025-07-01 DIAGNOSIS — Z98.51: ICD-10-CM

## 2025-07-01 DIAGNOSIS — M51.372: ICD-10-CM

## 2025-07-01 DIAGNOSIS — Z88.1: ICD-10-CM

## 2025-07-01 DIAGNOSIS — M47.26: Primary | ICD-10-CM

## 2025-07-01 LAB — GLUCOSE BLD-MCNC: 90 MG/DL (ref 70–110)

## 2025-07-01 PROCEDURE — 81025 URINE PREGNANCY TEST: CPT

## 2025-07-01 PROCEDURE — 63030 LAMOT DCMPRN NRV RT 1 LMBR: CPT

## 2025-07-01 PROCEDURE — 72100 X-RAY EXAM L-S SPINE 2/3 VWS: CPT

## 2025-07-01 RX ADMIN — METHYLPREDNISOLONE ACETATE ONE MG: 40 INJECTION, SUSPENSION INTRA-ARTICULAR; INTRALESIONAL; INTRAMUSCULAR; SOFT TISSUE at 08:43

## 2025-07-01 RX ADMIN — THROMBIN, TOPICAL (BOVINE) ONE UNIT: KIT at 08:07

## 2025-07-01 RX ADMIN — LIDOCAINE HYDROCHLORIDE,EPINEPHRINE BITARTRATE ONE ML: 20; .01 INJECTION, SOLUTION INFILTRATION; PERINEURAL at 08:07

## 2025-07-01 RX ADMIN — POTASSIUM CHLORIDE SCH MLS/HR: 14.9 INJECTION, SOLUTION INTRAVENOUS at 06:42

## 2025-07-01 RX ADMIN — MIDAZOLAM ONE MG: 1 INJECTION INTRAMUSCULAR; INTRAVENOUS at 07:03

## 2025-07-01 RX ADMIN — ONDANSETRON PRN MG: 2 INJECTION INTRAMUSCULAR; INTRAVENOUS at 06:44

## 2025-07-01 RX ADMIN — POTASSIUM CHLORIDE ONE MLS: 14.9 INJECTION, SOLUTION INTRAVENOUS at 10:04

## 2025-07-01 RX ADMIN — SODIUM CHLORIDE ONE MG: 9 INJECTION, SOLUTION INTRAVENOUS at 08:43

## 2025-07-01 RX ADMIN — POTASSIUM CHLORIDE ONE MLS: 14.9 INJECTION, SOLUTION INTRAVENOUS at 06:48

## 2025-07-01 RX ADMIN — HYALURONIDASE (HUMAN RECOMBINANT) ONE ML: 150 INJECTION, SOLUTION SUBCUTANEOUS at 08:07

## 2025-07-01 RX ADMIN — CEFAZOLIN ONE MLS: 330 INJECTION, POWDER, FOR SOLUTION INTRAMUSCULAR; INTRAVENOUS at 08:25

## 2025-07-01 RX ADMIN — GABAPENTIN PRN MG: 300 CAPSULE ORAL at 06:42

## 2025-07-01 RX ADMIN — ACETAMINOPHEN PRN MG: 500 TABLET ORAL at 06:42

## 2025-07-01 RX ADMIN — HYDROMORPHONE HYDROCHLORIDE PRN MG: 1 INJECTION, SOLUTION INTRAMUSCULAR; INTRAVENOUS; SUBCUTANEOUS at 09:39

## 2025-07-01 RX ADMIN — CEFAZOLIN SODIUM PRN MLS: 10 INJECTION, POWDER, FOR SOLUTION INTRAVENOUS at 07:42

## 2025-07-01 RX ADMIN — DEXAMETHASONE SODIUM PHOSPHATE ONE MG: 4 INJECTION, SOLUTION INTRAMUSCULAR; INTRAVENOUS at 06:44
